# Patient Record
Sex: FEMALE | Race: WHITE | NOT HISPANIC OR LATINO | Employment: FULL TIME | URBAN - METROPOLITAN AREA
[De-identification: names, ages, dates, MRNs, and addresses within clinical notes are randomized per-mention and may not be internally consistent; named-entity substitution may affect disease eponyms.]

---

## 2017-02-08 ENCOUNTER — COMMUNICATION - HEALTHEAST (OUTPATIENT)
Dept: FAMILY MEDICINE | Facility: CLINIC | Age: 47
End: 2017-02-08

## 2017-05-17 ENCOUNTER — OFFICE VISIT - HEALTHEAST (OUTPATIENT)
Dept: FAMILY MEDICINE | Facility: CLINIC | Age: 47
End: 2017-05-17

## 2017-05-17 DIAGNOSIS — R45.89 DEPRESSED MOOD: ICD-10-CM

## 2017-05-17 DIAGNOSIS — I10 ESSENTIAL HYPERTENSION: ICD-10-CM

## 2017-05-17 DIAGNOSIS — R53.83 OTHER MALAISE AND FATIGUE: ICD-10-CM

## 2017-05-17 DIAGNOSIS — F90.2 ADHD (ATTENTION DEFICIT HYPERACTIVITY DISORDER), COMBINED TYPE: ICD-10-CM

## 2017-05-17 DIAGNOSIS — N18.30 CHRONIC KIDNEY DISEASE, STAGE 3 (H): ICD-10-CM

## 2017-05-17 DIAGNOSIS — R53.81 OTHER MALAISE AND FATIGUE: ICD-10-CM

## 2017-05-17 DIAGNOSIS — R68.89 COLD INTOLERANCE: ICD-10-CM

## 2017-05-17 DIAGNOSIS — M31.30 WEGENER'S GRANULOMATOSIS: ICD-10-CM

## 2017-05-17 ASSESSMENT — MIFFLIN-ST. JEOR: SCORE: 1241.82

## 2017-05-22 LAB
GLIADIN IGA SER-ACNC: 1 U/ML
GLIADIN IGG SER-ACNC: 0.4 U/ML
IGA SERPL-MCNC: 111 MG/DL (ref 65–400)
TTG IGA SER-ACNC: 0.2 U/ML
TTG IGG SER-ACNC: <0.6 U/ML

## 2017-06-30 ENCOUNTER — COMMUNICATION - HEALTHEAST (OUTPATIENT)
Dept: FAMILY MEDICINE | Facility: CLINIC | Age: 47
End: 2017-06-30

## 2017-07-26 ENCOUNTER — COMMUNICATION - HEALTHEAST (OUTPATIENT)
Dept: FAMILY MEDICINE | Facility: CLINIC | Age: 47
End: 2017-07-26

## 2017-08-17 ENCOUNTER — COMMUNICATION - HEALTHEAST (OUTPATIENT)
Dept: FAMILY MEDICINE | Facility: CLINIC | Age: 47
End: 2017-08-17

## 2017-08-22 ENCOUNTER — COMMUNICATION - HEALTHEAST (OUTPATIENT)
Dept: FAMILY MEDICINE | Facility: CLINIC | Age: 47
End: 2017-08-22

## 2017-09-06 ENCOUNTER — AMBULATORY - HEALTHEAST (OUTPATIENT)
Dept: FAMILY MEDICINE | Facility: CLINIC | Age: 47
End: 2017-09-06

## 2017-09-06 DIAGNOSIS — I28.8 PULMONARY VASCULITIS (H): ICD-10-CM

## 2017-09-07 ENCOUNTER — AMBULATORY - HEALTHEAST (OUTPATIENT)
Dept: PULMONOLOGY | Facility: OTHER | Age: 47
End: 2017-09-07

## 2017-09-07 DIAGNOSIS — I28.8 PULMONARY VASCULITIS (H): ICD-10-CM

## 2017-09-13 ENCOUNTER — COMMUNICATION - HEALTHEAST (OUTPATIENT)
Dept: PULMONOLOGY | Facility: OTHER | Age: 47
End: 2017-09-13

## 2017-09-19 ENCOUNTER — COMMUNICATION - HEALTHEAST (OUTPATIENT)
Dept: SCHEDULING | Facility: CLINIC | Age: 47
End: 2017-09-19

## 2017-11-19 ENCOUNTER — COMMUNICATION - HEALTHEAST (OUTPATIENT)
Dept: FAMILY MEDICINE | Facility: CLINIC | Age: 47
End: 2017-11-19

## 2017-12-14 ENCOUNTER — RECORDS - HEALTHEAST (OUTPATIENT)
Dept: ADMINISTRATIVE | Facility: OTHER | Age: 47
End: 2017-12-14

## 2017-12-14 LAB
HPV_EXT - HISTORICAL: NORMAL
PAP SMEAR - HIM PATIENT REPORTED: NORMAL

## 2017-12-21 ENCOUNTER — HOSPITAL ENCOUNTER (OUTPATIENT)
Dept: MAMMOGRAPHY | Facility: HOSPITAL | Age: 47
Discharge: HOME | End: 2017-12-21
Attending: OBSTETRICS & GYNECOLOGY

## 2017-12-21 ENCOUNTER — COMMUNICATION - HEALTHEAST (OUTPATIENT)
Dept: FAMILY MEDICINE | Facility: CLINIC | Age: 47
End: 2017-12-21

## 2017-12-21 DIAGNOSIS — Z12.31 VISIT FOR SCREENING MAMMOGRAM: ICD-10-CM

## 2018-02-16 ENCOUNTER — RECORDS - HEALTHEAST (OUTPATIENT)
Dept: ADMINISTRATIVE | Facility: OTHER | Age: 48
End: 2018-02-16

## 2018-03-29 ENCOUNTER — OFFICE VISIT - HEALTHEAST (OUTPATIENT)
Dept: FAMILY MEDICINE | Facility: CLINIC | Age: 48
End: 2018-03-29

## 2018-03-29 DIAGNOSIS — F90.2 ADHD (ATTENTION DEFICIT HYPERACTIVITY DISORDER), COMBINED TYPE: ICD-10-CM

## 2018-03-29 DIAGNOSIS — K21.9 GERD (GASTROESOPHAGEAL REFLUX DISEASE): ICD-10-CM

## 2018-03-29 DIAGNOSIS — E55.9 VITAMIN D DEFICIENCY: ICD-10-CM

## 2018-03-29 DIAGNOSIS — M31.30 GRANULOMATOSIS WITH POLYANGIITIS (H): ICD-10-CM

## 2018-03-29 DIAGNOSIS — J32.4 CHRONIC PANSINUSITIS: ICD-10-CM

## 2018-03-29 DIAGNOSIS — N18.30 CHRONIC KIDNEY DISEASE, STAGE 3 (H): ICD-10-CM

## 2018-03-29 ASSESSMENT — MIFFLIN-ST. JEOR: SCORE: 1271.92

## 2018-05-06 ENCOUNTER — COMMUNICATION - HEALTHEAST (OUTPATIENT)
Dept: FAMILY MEDICINE | Facility: CLINIC | Age: 48
End: 2018-05-06

## 2018-06-19 ENCOUNTER — COMMUNICATION - HEALTHEAST (OUTPATIENT)
Dept: FAMILY MEDICINE | Facility: CLINIC | Age: 48
End: 2018-06-19

## 2018-08-16 ENCOUNTER — COMMUNICATION - HEALTHEAST (OUTPATIENT)
Dept: FAMILY MEDICINE | Facility: CLINIC | Age: 48
End: 2018-08-16

## 2018-10-09 ENCOUNTER — COMMUNICATION - HEALTHEAST (OUTPATIENT)
Dept: FAMILY MEDICINE | Facility: CLINIC | Age: 48
End: 2018-10-09

## 2018-11-26 ENCOUNTER — COMMUNICATION - HEALTHEAST (OUTPATIENT)
Dept: FAMILY MEDICINE | Facility: CLINIC | Age: 48
End: 2018-11-26

## 2019-03-12 ENCOUNTER — COMMUNICATION - HEALTHEAST (OUTPATIENT)
Dept: FAMILY MEDICINE | Facility: CLINIC | Age: 49
End: 2019-03-12

## 2019-03-18 ENCOUNTER — COMMUNICATION - HEALTHEAST (OUTPATIENT)
Dept: FAMILY MEDICINE | Facility: CLINIC | Age: 49
End: 2019-03-18

## 2019-03-21 ENCOUNTER — HOSPITAL ENCOUNTER (OUTPATIENT)
Dept: MAMMOGRAPHY | Facility: CLINIC | Age: 49
Discharge: HOME | End: 2019-03-21
Attending: OBSTETRICS & GYNECOLOGY

## 2019-03-21 DIAGNOSIS — Z12.31 VISIT FOR SCREENING MAMMOGRAM: ICD-10-CM

## 2019-03-22 ENCOUNTER — RECORDS - HEALTHEAST (OUTPATIENT)
Dept: ADMINISTRATIVE | Facility: OTHER | Age: 49
End: 2019-03-22

## 2019-03-26 ENCOUNTER — HOSPITAL ENCOUNTER (OUTPATIENT)
Dept: MAMMOGRAPHY | Facility: CLINIC | Age: 49
Discharge: HOME | End: 2019-03-26
Attending: OBSTETRICS & GYNECOLOGY

## 2019-03-26 DIAGNOSIS — N64.89 BREAST ASYMMETRY: ICD-10-CM

## 2019-07-08 ENCOUNTER — COMMUNICATION - HEALTHEAST (OUTPATIENT)
Dept: FAMILY MEDICINE | Facility: CLINIC | Age: 49
End: 2019-07-08

## 2019-07-08 DIAGNOSIS — F90.2 ADHD (ATTENTION DEFICIT HYPERACTIVITY DISORDER), COMBINED TYPE: ICD-10-CM

## 2019-09-01 ENCOUNTER — COMMUNICATION - HEALTHEAST (OUTPATIENT)
Dept: FAMILY MEDICINE | Facility: CLINIC | Age: 49
End: 2019-09-01

## 2019-09-09 ENCOUNTER — COMMUNICATION - HEALTHEAST (OUTPATIENT)
Dept: FAMILY MEDICINE | Facility: CLINIC | Age: 49
End: 2019-09-09

## 2019-09-09 DIAGNOSIS — F90.2 ADHD (ATTENTION DEFICIT HYPERACTIVITY DISORDER), COMBINED TYPE: ICD-10-CM

## 2019-11-14 ENCOUNTER — OFFICE VISIT - HEALTHEAST (OUTPATIENT)
Dept: FAMILY MEDICINE | Facility: CLINIC | Age: 49
End: 2019-11-14

## 2019-11-14 DIAGNOSIS — N18.30 CHRONIC KIDNEY DISEASE, STAGE 3 (H): ICD-10-CM

## 2019-11-14 DIAGNOSIS — R10.13 ABDOMINAL PAIN, EPIGASTRIC: ICD-10-CM

## 2019-11-14 DIAGNOSIS — M31.31 GRANULOMATOSIS WITH POLYANGIITIS WITH RENAL INVOLVEMENT (H): ICD-10-CM

## 2019-11-14 DIAGNOSIS — Z13.220 SCREENING CHOLESTEROL LEVEL: ICD-10-CM

## 2019-11-14 DIAGNOSIS — F90.2 ADHD (ATTENTION DEFICIT HYPERACTIVITY DISORDER), COMBINED TYPE: ICD-10-CM

## 2019-11-14 LAB
CHOLEST SERPL-MCNC: 261 MG/DL
FASTING STATUS PATIENT QL REPORTED: YES
HDLC SERPL-MCNC: 84 MG/DL
LDLC SERPL CALC-MCNC: 159 MG/DL
TRIGL SERPL-MCNC: 88 MG/DL

## 2019-11-14 ASSESSMENT — MIFFLIN-ST. JEOR: SCORE: 1192.49

## 2019-11-22 ENCOUNTER — RECORDS - HEALTHEAST (OUTPATIENT)
Dept: ADMINISTRATIVE | Facility: OTHER | Age: 49
End: 2019-11-22

## 2019-11-22 ENCOUNTER — TRANSFERRED RECORDS (OUTPATIENT)
Dept: HEALTH INFORMATION MANAGEMENT | Facility: CLINIC | Age: 49
End: 2019-11-22

## 2019-12-05 ENCOUNTER — TRANSFERRED RECORDS (OUTPATIENT)
Dept: HEALTH INFORMATION MANAGEMENT | Facility: CLINIC | Age: 49
End: 2019-12-05

## 2019-12-19 ENCOUNTER — TELEPHONE (OUTPATIENT)
Dept: OTOLARYNGOLOGY | Facility: CLINIC | Age: 49
End: 2019-12-19

## 2019-12-19 NOTE — TELEPHONE ENCOUNTER
"[facility]    Phone Message    May a detailed message be left on voicemail: no    Reason for Call: Angie from [facility] calling to check up on referral they sent \"electronically.\" Writer informed caller that we have not received referral yet and asked caller to fax referral and records to: [phone]. Caller stating that they are referring patient for Cholesteatoma. Per scheduling guidelines patient is to be seen within 10 days. Writer informed caller that someone from the clinic would be contacting patient to schedule.   Please advise. Thank you, Judy Garcia on 12/19/2019 at 4:45 PM     Action Taken: Message routed to:  [facility] E[facility]    "

## 2019-12-20 ENCOUNTER — TRANSCRIBE ORDERS (OUTPATIENT)
Dept: OTHER | Age: 49
End: 2019-12-20

## 2019-12-20 DIAGNOSIS — H71.90 CHOLESTEATOMA, UNSPECIFIED LATERALITY: Primary | ICD-10-CM

## 2019-12-20 NOTE — TELEPHONE ENCOUNTER
Records and referral received and sent to HIM to be scanned into the chart.  Copy placed in clinic referrals for review rightfax folder.

## 2019-12-27 ENCOUNTER — TELEPHONE (OUTPATIENT)
Dept: OTOLARYNGOLOGY | Facility: CLINIC | Age: 49
End: 2019-12-27

## 2019-12-30 NOTE — TELEPHONE ENCOUNTER
FUTURE VISIT INFORMATION      FUTURE VISIT INFORMATION:    Date: 2/11/20    Time: 8:40 AM; Audio 7:30 AM    Location[place] [facility]  REFERRAL INFORMATION:    Referring provider:  Dr. Mikey Franco    Referring providers clinic:  [facility]    Reason for visit/diagnosis: Cholesteatoma    RECORDS REQUESTED FROM:       Clinic name Comments Records Status Imaging Status   [facility] 12/22/19 - OV with Dr. Franco  11/22/19 - OV with Dr. Franco  11/22/19 - Audiogram 12/31 Sent to Scan    [facility] 10/20/09 - OP Note for RIGHT TYMPAnOPLASTY WITH CANAL UP MASTOIDECTOMY with Dr. Nas Oswald 12/31 Sent to Scan    [facility] 12/5/19 - CT Temporal Bone WO Scanned In 12/30 Req - PACS                             * 12/30/19 9:12 AM Faxed req to [facility] for Audiograms and additional records, and [facility] for images - Marcela  * 12/30/19 5:33PM images from [facility] in PACS, waiting on [facility] recs - Amay   * 12/31/19 12:27 PM Recs received from [facility] and sent to HIM to be scanned into the chart. - Marcela

## 2020-01-20 ENCOUNTER — OFFICE VISIT - HEALTHEAST (OUTPATIENT)
Dept: FAMILY MEDICINE | Facility: CLINIC | Age: 50
End: 2020-01-20

## 2020-01-20 DIAGNOSIS — F33.0 MILD EPISODE OF RECURRENT MAJOR DEPRESSIVE DISORDER (H): ICD-10-CM

## 2020-01-20 DIAGNOSIS — F90.2 ADHD (ATTENTION DEFICIT HYPERACTIVITY DISORDER), COMBINED TYPE: ICD-10-CM

## 2020-01-20 DIAGNOSIS — F41.9 ANXIETY: ICD-10-CM

## 2020-01-20 ASSESSMENT — ANXIETY QUESTIONNAIRES
3. WORRYING TOO MUCH ABOUT DIFFERENT THINGS: SEVERAL DAYS
6. BECOMING EASILY ANNOYED OR IRRITABLE: SEVERAL DAYS
4. TROUBLE RELAXING: MORE THAN HALF THE DAYS
1. FEELING NERVOUS, ANXIOUS, OR ON EDGE: SEVERAL DAYS
7. FEELING AFRAID AS IF SOMETHING AWFUL MIGHT HAPPEN: NOT AT ALL
5. BEING SO RESTLESS THAT IT IS HARD TO SIT STILL: SEVERAL DAYS
2. NOT BEING ABLE TO STOP OR CONTROL WORRYING: SEVERAL DAYS
GAD7 TOTAL SCORE: 7

## 2020-01-20 ASSESSMENT — MIFFLIN-ST. JEOR: SCORE: 1215.79

## 2020-01-20 ASSESSMENT — PATIENT HEALTH QUESTIONNAIRE - PHQ9: SUM OF ALL RESPONSES TO PHQ QUESTIONS 1-9: 6

## 2020-01-24 DIAGNOSIS — H91.90 HEARING LOSS, UNSPECIFIED HEARING LOSS TYPE, UNSPECIFIED LATERALITY: Primary | ICD-10-CM

## 2020-02-11 ENCOUNTER — PRE VISIT (OUTPATIENT)
Dept: OTOLARYNGOLOGY | Facility: CLINIC | Age: 50
End: 2020-02-11

## 2020-02-11 ENCOUNTER — OFFICE VISIT (OUTPATIENT)
Dept: AUDIOLOGY | Facility: CLINIC | Age: 50
End: 2020-02-11
Payer: COMMERCIAL

## 2020-02-11 ENCOUNTER — TELEPHONE (OUTPATIENT)
Dept: OTOLARYNGOLOGY | Facility: CLINIC | Age: 50
End: 2020-02-11

## 2020-02-11 ENCOUNTER — OFFICE VISIT (OUTPATIENT)
Dept: OTOLARYNGOLOGY | Facility: CLINIC | Age: 50
End: 2020-02-11
Attending: OTOLARYNGOLOGY
Payer: COMMERCIAL

## 2020-02-11 VITALS
TEMPERATURE: 100.3 F | WEIGHT: 141.9 LBS | SYSTOLIC BLOOD PRESSURE: 122 MMHG | DIASTOLIC BLOOD PRESSURE: 77 MMHG | HEART RATE: 109 BPM

## 2020-02-11 DIAGNOSIS — H90.71 MIXED CONDUCTIVE AND SENSORINEURAL HEARING LOSS OF RIGHT EAR WITH UNRESTRICTED HEARING OF LEFT EAR: ICD-10-CM

## 2020-02-11 DIAGNOSIS — H90.71 MIXED HEARING LOSS OF RIGHT EAR: Primary | ICD-10-CM

## 2020-02-11 DIAGNOSIS — H91.90 HEARING LOSS, UNSPECIFIED HEARING LOSS TYPE, UNSPECIFIED LATERALITY: ICD-10-CM

## 2020-02-11 DIAGNOSIS — H73.891 TYMPANIC MEMBRANE RETRACTION, RIGHT: Primary | ICD-10-CM

## 2020-02-11 PROBLEM — J38.6 SUBGLOTTIC STENOSIS: Status: ACTIVE | Noted: 2020-02-11

## 2020-02-11 RX ORDER — FLUTICASONE PROPIONATE 50 MCG
SPRAY, SUSPENSION (ML) NASAL
COMMUNITY
Start: 2019-11-26 | End: 2021-05-13

## 2020-02-11 RX ORDER — IBUPROFEN 600 MG/1
600 TABLET, FILM COATED ORAL
COMMUNITY
Start: 2014-02-23 | End: 2022-11-21

## 2020-02-11 RX ORDER — FLUTICASONE PROPIONATE 220 UG/1
2 AEROSOL, METERED RESPIRATORY (INHALATION)
COMMUNITY
Start: 2017-05-17 | End: 2021-05-13

## 2020-02-11 RX ORDER — LOSARTAN POTASSIUM 25 MG/1
25 TABLET ORAL
COMMUNITY
Start: 2018-03-29 | End: 2021-07-28

## 2020-02-11 RX ORDER — SULFAMETHOXAZOLE/TRIMETHOPRIM 800-160 MG
TABLET ORAL
COMMUNITY
Start: 2019-09-17

## 2020-02-11 ASSESSMENT — PAIN SCALES - GENERAL: PAINLEVEL: NO PAIN (0)

## 2020-02-11 NOTE — TELEPHONE ENCOUNTER
Patient left without checking out.    Attempted to call patient to schedule [facility] NEW THROAT appointment with Dr. Murray, Dr. Hagan, or Dr. Adan. Appointment notes: Airway clearance for surgery, subglottic stenosis, ref'd by Dr. Mckeon.    Phone was unavailable/didn't ring/only loud beeping. Unable to leave .

## 2020-02-11 NOTE — LETTER
"2020       RE: Dasha Valenzuela   [address]  [place] [address]     Dear Colleague,    Thank you for referring your patient, Dasha Valenzuela, to the [facility] EAR NOSE AND THROAT at [facility]. Please see a copy of my visit note below.      Neurotology Clinic      Name: Dasha Valenzuela  MRN: [id number]  Age: 49 year old  : 1970  Referring provider: Vamsi Franco  2020      Chief Complaint:   Consult     History of Present Illness:   Dasha Valenzuela is a 49 year old female who presents for consultation regarding hearing loss.  Consultation was requested by Dr. Vamsi Franco.  She notes her right ear has been problematic for many years and she had a previous canal wall up mastoidectomy approximately 10 years ago.  She did notice improvement in her hearing after that surgery.  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  This lead her to follow up with her ENT and have a repeat audiogram and CT scan.  She has tried in-canal hearing aid in the past however this is difficult as she uses a stethoscope frequently in her job.  She also did not find the hearing aid particularly helpful.      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  She did have significant laryngitis at the time.  She had what may have been laryngospasm following the procedure (not clear if actually subglottic stenosis-related but concerning for it) and required an ICU stay.  She has not had any ongoing symptoms since that time however she has also not been intubated since then.    Excerpt from previous operative note - right tympanoplasty with canal wall up mastoidectomy 10/20/2009:  \"She failed tympanoplasty about 10 years " "ago....Severely retracted mid-portion of the TM.... The middle ear was filled, especially posteriorly and inferiorly with what looked to be tympanosclerotic plaque.  The retraction pocket was slowly and methodically freed up.  It went quit a bit anteriorly and this was all removed.  There was thickened, heaped up mucosa around the ossicles.  This was cleared out and this revealed although there was some erosion of the lenticular process of the incus, the dadbfom-xukgl-blbjcp complex was intact and mobile.\"     Review of Systems:   Pertinent items are noted in HPI or as in patient entered ROS below, remainder of complete ROS is negative.    ENT ROS 2020   Cardiopulmonary Cough        Active Medications:     Current Outpatient Medications:      fluticasone (FLOVENT HFA) 220 MCG/ACT inhaler, Inhale 2 puffs into the lungs, Disp: , Rfl:      ibuprofen (ADVIL/MOTRIN) 600 MG tablet, Take 600 mg by mouth, Disp: , Rfl:      losartan (COZAAR) 25 MG tablet, Take 25 mg by mouth, Disp: , Rfl:      sulfamethoxazole-trimethoprim (BACTRIM DS/SEPTRA DS) 800-160 MG tablet, TAKE 1 TABLET BY MOUTH DAY, Disp: , Rfl:      AZATHIOPRINE PO, Take 150 mg by mouth daily, Disp: , Rfl:      fluticasone (FLONASE) 50 MCG/ACT nasal spray, , Disp: , Rfl:      Pantoprazole Sodium (PROTONIX PO), Take 40 mg by mouth every morning (before breakfast), Disp: , Rfl:      Prenatal Vit-Fe Fumarate-FA (PRENATAL MULTIVITAMIN  PLUS IRON) 27-0.8 MG TABS, Take 1 tablet by mouth daily, Disp: , Rfl:      sertraline (ZOLOFT) 50 MG tablet, , Disp: , Rfl:      Allergies:   No known drug allergies.       Past Medical History:  Wegener's granulomatosis  Nephrotic syndrome  Chronic kidney disease, stage 3  Gastroesophageal reflux disease  ASCUS Pap smear  Chronic sinusitis  Anxiety  Attention deficit hyperactivity disorder      Past Surgical History:   section   Nasal surgery    Family History:   History reviewed. No pertinent family history.      Social " History:   Presents to clinic alone.  Tobacco Use: No previous or current tobacco use.   Alcohol Use: Occasional alcohol use.      Physical Exam:   /77   Pulse 109   Temp 100.3  F (37.9  C)   Wt 64.4 kg (141 lb 14.4 oz)      Constitutional:  The patient was unaccompanied, well-groomed, and in no acute distress.     Skin: Normal:  warm and pink without rash   Neurologic: Alert and oriented x 3.  CN's III-XII within normal limits.  Voice normal.    Psychiatric: The patient's affect was calm, cooperative, and appropriate.     Communication:  Normal; communicates verbally, normal voice quality.   Respiratory: Breathing comfortably without stridor or exertion of accessory muscles.    Eyes: Pupils were equal and reactive.  Extraocular movement intact.     Ears: Pinnae and tragus non-tender.         Otologic microscope exam:  Right ear: Anterior superior perforation present.  Malleus in place.  Remainder of reconstructed TM retracted onto incus, possibly already onto the stapes.  No debris. TM thick inferiorly and suspect scar to promontory. No cholesteatoma. Posterior auricular incision.  Left ear: EAC and TM are clear.     Audiogram:  AUDIOGRAM: She underwent an audiogram today. This demonstrated:  Results: Normal hearing left ear. Mild to moderately-severe mixed hearing loss right ear. Negative Daria. Normal eardrum mobility left, restricted right. Present left ipsi reflex, all other conditions absent.    The speech reception threshold is 60 dB on the right, 15 dB on the left, with 100% word recognition. Tympanograms showed As type on the right and type A on the left.     Imaging:  Temporal bone CT 12/5/2019:       On my review of the patient's imaging:  Left temporal bone appears normal.  Right side appears to have previous partial mastoidectomy and there is opacification in the middle ear space.      Assessment and Plan:  Dasha Valenzuela is a 49 year old female with moderately-severe mixed hearing loss in  the right ear.  We discussed options at length including ongoing surveillance, hearing aid, and surgical management.  With her history of vasculitis, she does not have normal naso-sinus anatomy or eustachian tube function.  Hearing aid would definitely help her hearing however she it not interested given her previous experience with an in canal aid. I encouraged her to try another model of hearing aid.    We discussed right revision tympanomastoidectomy with cartilage and laser and T-tube placement.  There is a distinct possibility this would not improve her hearing. I am sure it will not make it symmetric with the other side. There is a risk of making it worse.  One reason for doing it would be to see if the retraction can be addressed, but given the GPA, a fully aerated middle ear space may not be achievable. Tube or leaving the current perforation in place are likely needed.    The risks and benefits were discussed.  The risks include but are not limited to:  Worsened hearing which may require further surgery, profound and irreversible hearing loss, dizziness, damage to the taste nerve, damage to the facial nerve, tympanic membrane perforation requiring further surgery and infection.  Postoperative restrictions were discussed.      She will need airway clearance given her history of subglottic stenosis and I have asked her to have her [facility] records transferred here as well as see our anesthesia team prior to surgery.  I also offered referral to laryngology for assessment and that may be needed.    Yue Mckeon MD  Otology & Neurotology  [facility]    The documentation recorded by the scribe accurately reflects the services I personally performed and the decisions made by me.        Scribe Disclosure:  I, Yohana Cristin, am serving as a scribe to document services personally performed by Yue Mckeon MD at this visit, based upon the provider's statements to me. All documentation has been  reviewed by the aforementioned provider prior to being entered into the official medical record.    Again, thank you for allowing me to participate in the care of your patient.      Sincerely,    Yue Mckeon MD

## 2020-02-11 NOTE — NURSING NOTE
Chief Complaint   Patient presents with     Consult     Cholesteatoma right side      Cough     fever      /77   Pulse 109   Temp 100.3  F (37.9  C)   Wt 64.4 kg (141 lb 14.4 oz)     Claudette Thomas LPN

## 2020-02-11 NOTE — NURSING NOTE
Plan of care:  Patient will require airway assessment and clearance at [facility] with one of the larynogologists: Loco Murray Misono for known subglottic stenosis prior to scheduling right revision tympanomastoidectomy/cartilage backed with T-tube placement, diode laser/ Facial nerve monitor.   Patient reports that she has had laryngospasm and re-intubation after previous intubation.( [place]).  Dr. Mckeon also reviewed that use of hearing aids is another reasonable modality recommended  To address her hearing concern.  Patient did not have instruction in this regard prior to leaving the clinic as she had left room, was not feeling well.    AVS with this information has been completed and mailed to patient.Schedulers will call to make this appt.    RELL López R.N., ABDIEL

## 2020-02-11 NOTE — PATIENT INSTRUCTIONS
1.  You were seen in the ENT Clinic today by . If you have any questions or concerns after your appointment, please call [phone]. Press option #1 for scheduling related needs. Press option #3 for Nurse advice.    2.  Please schedule an appointment for the following:   - laryngologists for airway clearance before moving forward with surgery scheduling.          Yas Ruiz LPN  [facility] - Otolaryngology    Thanks for coming in today: prior to scehduling ear surgery, Dr Mckeon  requires airway assessment and [place] [facility] [place] one of the larynogologists: Loco Murray Misono for known subglottic stenosis prior to scheduling right revision tympanomastoidectomy with T-tube placement, diode laser. Our schedulers will reach out to schedule this laryngology appointment.       Dr. Mckeon also reviewed that use of hearing aids is another reasonable modality recommended to address your hearing concerns.    Thank you,  RELL López R.N., CORLN

## 2020-02-11 NOTE — NURSING NOTE
If patient would like to move forward with ear surgery with Dr. Mckeon the patient will need to make an appointment with Laryngology, either Dr. Hagan, Dr. Murray, or Dr. Adan for airway clearance and surgical risk related to Patient's significant history of subglottic stenosis. Once patient has received airway clearance and surgical risk is known a case request for surgery may be put in after reviewed by Dr. Mckeon. Patient is thinking she will move forward with surgery sometime this summer. Patient will need to meet with PAC for her pre-op history and physical. Case request for surgery with Dr. Mckeon are as follows:    Procedure: Right Revision Tympanomastoidectomy with T-Tube placement at [facility]    Additional Instructions for Case Request       Is this a multi surgeon/procedure case?: No  Surgeon Procedure time (incision-close in minutes) 210  Explant?: No  Urgency of Surgery?: Patients Choice/ Next Available  H&P to be completed by?:PAC  Patients special needs?:  N/A  Patient Positioning?: Supine   needed?:  No  Reservable OR equipment needed?:  Diode Laser and Facial Nerve Monitoring  Expected Length of Stay:Today  Postop appointment?:3 weeks  Surgical assistants?:  No  Patient has electric implant?:  No  Implants required for the case?:  No  Other\additional comments as needed?: N/A  Pre-Op Medications: cefuroxime sodium 1.5 gm  Imaging Needed Pre-op? What type? N/A  Packet Sent/ Given to Patient? No    Patient left clinic prior to discussion about this. Patient will be mailed a plan as clinic coordinator attempted to call patient and was unable to connect. Patient will need surgical packet and soap once surgery has been scheduled.     Lizz Van RN

## 2020-02-11 NOTE — PROGRESS NOTES
AUDIOLOGY REPORT    SUMMARY: Audiology visit completed. See audiogram for results.      RECOMMENDATIONS: Follow-up with ENT.    Chepe Foreman, [facility]  Licensed Audiologist  [place] License #[id number]

## 2020-02-11 NOTE — PROGRESS NOTES
"  Neurotology Clinic      Name: Dasha Valenzuela  MRN: [id number]  Age: 49 year old  : 1970  Referring provider: Vamsi Franco  2020      Chief Complaint:   Consult     History of Present Illness:   Dasha Valenzuela is a 49 year old female who presents for consultation regarding hearing loss.  Consultation was requested by Dr. Vamsi Franco.  She notes her right ear has been problematic for many years and she had a previous canal wall up mastoidectomy approximately 10 years ago.  She did notice improvement in her hearing after that surgery.  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  This lead her to follow up with her ENT and have a repeat audiogram and CT scan.  She has tried in-canal hearing aid in the past however this is difficult as she uses a stethoscope frequently in her job.  She also did not find the hearing aid particularly helpful.      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  She did have significant laryngitis at the time.  She had what may have been laryngospasm following the procedure (not clear if actually subglottic stenosis-related but concerning for it) and required an ICU stay.  She has not had any ongoing symptoms since that time however she has also not been intubated since then.    Excerpt from previous operative note - right tympanoplasty with canal wall up mastoidectomy 10/20/2009:  \"She failed tympanoplasty about 10 years ago....Severely retracted mid-portion of the TM.... The middle ear was filled, especially posteriorly and inferiorly with what looked to be tympanosclerotic plaque.  The retraction pocket was slowly and methodically freed up.  It went quit a bit anteriorly and this was all removed.  There was " "thickened, heaped up mucosa around the ossicles.  This was cleared out and this revealed although there was some erosion of the lenticular process of the incus, the sldnsup-hlshe-udbycw complex was intact and mobile.\"     Review of Systems:   Pertinent items are noted in HPI or as in patient entered ROS below, remainder of complete ROS is negative.   [facility] ROS 2020   Cardiopulmonary Cough        Active Medications:     Current Outpatient Medications:      fluticasone (FLOVENT HFA) 220 MCG/ACT inhaler, Inhale 2 puffs into the lungs, Disp: , Rfl:      ibuprofen (ADVIL/MOTRIN) 600 MG tablet, Take 600 mg by mouth, Disp: , Rfl:      losartan (COZAAR) 25 MG tablet, Take 25 mg by mouth, Disp: , Rfl:      sulfamethoxazole-trimethoprim (BACTRIM DS/SEPTRA DS) 800-160 MG tablet, TAKE 1 TABLET BY MOUTH DAY, Disp: , Rfl:      AZATHIOPRINE PO, Take 150 mg by mouth daily, Disp: , Rfl:      fluticasone (FLONASE) 50 MCG/ACT nasal spray, , Disp: , Rfl:      Pantoprazole Sodium (PROTONIX PO), Take 40 mg by mouth every morning (before breakfast), Disp: , Rfl:      Prenatal Vit-Fe Fumarate-FA (PRENATAL MULTIVITAMIN  PLUS IRON) 27-0.8 MG TABS, Take 1 tablet by mouth daily, Disp: , Rfl:      sertraline (ZOLOFT) 50 MG tablet, , Disp: , Rfl:      Allergies:   No known drug allergies.       Past Medical History:  Wegener's granulomatosis  Nephrotic syndrome  Chronic kidney disease, stage 3  Gastroesophageal reflux disease  ASCUS Pap smear  Chronic sinusitis  Anxiety  Attention deficit hyperactivity disorder      Past Surgical History:   section   Nasal surgery    Family History:   History reviewed. No pertinent family history.      Social History:   Presents to clinic alone.  Tobacco Use: No previous or current tobacco use.   Alcohol Use: Occasional alcohol use.      Physical Exam:   /77   Pulse 109   Temp 100.3  F (37.9  C)   Wt 64.4 kg (141 lb 14.4 oz)      Constitutional:  The patient was unaccompanied, " well-groomed, and in no acute distress.     Skin: Normal:  warm and pink without rash   Neurologic: Alert and oriented x 3.  CN's III-XII within normal limits.  Voice normal.    Psychiatric: The patient's affect was calm, cooperative, and appropriate.     Communication:  Normal; communicates verbally, normal voice quality.   Respiratory: Breathing comfortably without stridor or exertion of accessory muscles.    Eyes: Pupils were equal and reactive.  Extraocular movement intact.     Ears: Pinnae and tragus non-tender.         Otologic microscope exam:  Right ear: Anterior superior perforation present.  Malleus in place.  Remainder of reconstructed TM retracted onto incus, possibly already onto the stapes.  No debris. TM thick inferiorly and suspect scar to promontory. No cholesteatoma. Posterior auricular incision.  Left ear: EAC and TM are clear.     Audiogram:  AUDIOGRAM: She underwent an audiogram today. This demonstrated:  Results: Normal hearing left ear. Mild to moderately-severe mixed hearing loss right ear. Negative Daria. Normal eardrum mobility left, restricted right. Present left ipsi reflex, all other conditions absent.    The speech reception threshold is 60 dB on the right, 15 dB on the left, with 100% word recognition. Tympanograms showed As type on the right and type A on the left.     Imaging:  Temporal bone CT 12/5/2019:       On my review of the patient's imaging:  Left temporal bone appears normal.  Right side appears to have previous partial mastoidectomy and there is opacification in the middle ear space.      Assessment and Plan:  Dasha Valenzuela is a 49 year old female with moderately-severe mixed hearing loss in the right ear.  We discussed options at length including ongoing surveillance, hearing aid, and surgical management.  With her history of vasculitis, she does not have normal naso-sinus anatomy or eustachian tube function.  Hearing aid would definitely help her hearing however she it  not interested given her previous experience with an in canal aid. I encouraged her to try another model of hearing aid.    We discussed right revision tympanomastoidectomy with cartilage and laser and T-tube placement.  There is a distinct possibility this would not improve her hearing. I am sure it will not make it symmetric with the other side. There is a risk of making it worse.  One reason for doing it would be to see if the retraction can be addressed, but given the GPA, a fully aerated middle ear space may not be achievable. Tube or leaving the current perforation in place are likely needed.    The risks and benefits were discussed.  The risks include but are not limited to:  Worsened hearing which may require further surgery, profound and irreversible hearing loss, dizziness, damage to the taste nerve, damage to the facial nerve, tympanic membrane perforation requiring further surgery and infection.  Postoperative restrictions were discussed.      She will need airway clearance given her history of subglottic stenosis and I have asked her to have her [facility] records transferred here as well as see our anesthesia team prior to surgery.  I also offered referral to laryngology for assessment and that may be needed.    Yue Mckeon MD  Otology & Neurotology  [facility]    The documentation recorded by the scribe accurately reflects the services I personally performed and the decisions made by me.        Scribe Disclosure:  I, Yohana Azevedonyla, am serving as a scribe to document services personally performed by Yue Mckeon MD at this visit, based upon the provider's statements to me. All documentation has been reviewed by the aforementioned provider prior to being entered into the official medical record.

## 2020-03-12 ENCOUNTER — RECORDS - HEALTHEAST (OUTPATIENT)
Dept: ADMINISTRATIVE | Facility: OTHER | Age: 50
End: 2020-03-12

## 2020-04-30 ENCOUNTER — COMMUNICATION - HEALTHEAST (OUTPATIENT)
Dept: FAMILY MEDICINE | Facility: CLINIC | Age: 50
End: 2020-04-30

## 2020-04-30 ENCOUNTER — AMBULATORY - HEALTHEAST (OUTPATIENT)
Dept: FAMILY MEDICINE | Facility: CLINIC | Age: 50
End: 2020-04-30

## 2020-04-30 DIAGNOSIS — F41.9 ANXIETY: ICD-10-CM

## 2020-07-29 ENCOUNTER — RECORDS - HEALTHEAST (OUTPATIENT)
Dept: HEALTH INFORMATION MANAGEMENT | Facility: CLINIC | Age: 50
End: 2020-07-29

## 2020-10-01 ENCOUNTER — RECORDS - HEALTHEAST (OUTPATIENT)
Dept: ADMINISTRATIVE | Facility: OTHER | Age: 50
End: 2020-10-01

## 2020-10-01 ENCOUNTER — HOSPITAL ENCOUNTER (OUTPATIENT)
Dept: MAMMOGRAPHY | Facility: CLINIC | Age: 50
Discharge: HOME | End: 2020-10-01
Attending: OBSTETRICS & GYNECOLOGY

## 2020-10-01 DIAGNOSIS — Z12.31 SCREENING MAMMOGRAM, ENCOUNTER FOR: ICD-10-CM

## 2020-10-15 ENCOUNTER — HOSPITAL ENCOUNTER (OUTPATIENT)
Dept: MAMMOGRAPHY | Facility: CLINIC | Age: 50
Discharge: HOME | End: 2020-10-15
Attending: OBSTETRICS & GYNECOLOGY

## 2020-10-15 DIAGNOSIS — N64.89 BREAST ASYMMETRY: ICD-10-CM

## 2020-11-19 ENCOUNTER — OFFICE VISIT - HEALTHEAST (OUTPATIENT)
Dept: FAMILY MEDICINE | Facility: CLINIC | Age: 50
End: 2020-11-19

## 2020-11-19 DIAGNOSIS — M31.31 GRANULOMATOSIS WITH POLYANGIITIS WITH RENAL INVOLVEMENT (H): ICD-10-CM

## 2020-11-19 DIAGNOSIS — J38.6 SUBGLOTTIC STENOSIS: ICD-10-CM

## 2020-11-19 DIAGNOSIS — R05.9 COUGH: ICD-10-CM

## 2020-11-19 DIAGNOSIS — N18.31 STAGE 3A CHRONIC KIDNEY DISEASE (H): ICD-10-CM

## 2020-11-21 ENCOUNTER — COMMUNICATION - HEALTHEAST (OUTPATIENT)
Dept: SCHEDULING | Facility: CLINIC | Age: 50
End: 2020-11-21

## 2020-12-11 ENCOUNTER — COMMUNICATION - HEALTHEAST (OUTPATIENT)
Dept: FAMILY MEDICINE | Facility: CLINIC | Age: 50
End: 2020-12-11

## 2020-12-11 DIAGNOSIS — M31.31 GRANULOMATOSIS WITH POLYANGIITIS WITH RENAL INVOLVEMENT (H): ICD-10-CM

## 2021-04-26 ENCOUNTER — RECORDS - HEALTHEAST (OUTPATIENT)
Dept: ADMINISTRATIVE | Facility: OTHER | Age: 51
End: 2021-04-26

## 2021-04-30 ENCOUNTER — RECORDS - HEALTHEAST (OUTPATIENT)
Dept: FAMILY MEDICINE | Facility: CLINIC | Age: 51
End: 2021-04-30

## 2021-05-06 ENCOUNTER — HOSPITAL ENCOUNTER (OUTPATIENT)
Dept: MAMMOGRAPHY | Facility: CLINIC | Age: 51
Discharge: HOME | End: 2021-05-06
Attending: OBSTETRICS & GYNECOLOGY
Payer: COMMERCIAL

## 2021-05-06 DIAGNOSIS — R92.8 OTHER ABNORMAL AND INCONCLUSIVE FINDINGS ON DIAGNOSTIC IMAGING OF BREAST: ICD-10-CM

## 2021-05-13 ENCOUNTER — RECORDS - HEALTHEAST (OUTPATIENT)
Dept: SCHEDULING | Facility: CLINIC | Age: 51
End: 2021-05-13

## 2021-05-13 ENCOUNTER — NURSE TRIAGE (OUTPATIENT)
Dept: NURSING | Facility: CLINIC | Age: 51
End: 2021-05-13

## 2021-05-13 ENCOUNTER — OFFICE VISIT (OUTPATIENT)
Dept: FAMILY MEDICINE | Facility: CLINIC | Age: 51
End: 2021-05-13
Payer: COMMERCIAL

## 2021-05-13 VITALS
WEIGHT: 152.5 LBS | TEMPERATURE: 97.5 F | RESPIRATION RATE: 20 BRPM | SYSTOLIC BLOOD PRESSURE: 116 MMHG | BODY MASS INDEX: 28.79 KG/M2 | DIASTOLIC BLOOD PRESSURE: 72 MMHG | OXYGEN SATURATION: 98 % | HEART RATE: 90 BPM | HEIGHT: 61 IN

## 2021-05-13 DIAGNOSIS — M31.31 GRANULOMATOSIS WITH POLYANGIITIS WITH RENAL INVOLVEMENT (H): ICD-10-CM

## 2021-05-13 DIAGNOSIS — F90.2 ADHD (ATTENTION DEFICIT HYPERACTIVITY DISORDER), COMBINED TYPE: ICD-10-CM

## 2021-05-13 DIAGNOSIS — F41.9 ANXIETY: Primary | ICD-10-CM

## 2021-05-13 DIAGNOSIS — F33.2 SEVERE EPISODE OF RECURRENT MAJOR DEPRESSIVE DISORDER, WITHOUT PSYCHOTIC FEATURES (H): ICD-10-CM

## 2021-05-13 PROCEDURE — 99214 OFFICE O/P EST MOD 30 MIN: CPT | Performed by: FAMILY MEDICINE

## 2021-05-13 PROCEDURE — 96127 BRIEF EMOTIONAL/BEHAV ASSMT: CPT | Mod: 59 | Performed by: FAMILY MEDICINE

## 2021-05-13 RX ORDER — ALPRAZOLAM 0.5 MG
0.5 TABLET ORAL 3 TIMES DAILY PRN
Qty: 20 TABLET | Refills: 0 | Status: SHIPPED | OUTPATIENT
Start: 2021-05-13 | End: 2022-11-21

## 2021-05-13 RX ORDER — SERTRALINE HYDROCHLORIDE 100 MG/1
100 TABLET, FILM COATED ORAL DAILY
Qty: 90 TABLET | Refills: 1 | Status: SHIPPED | OUTPATIENT
Start: 2021-05-13 | End: 2021-06-10

## 2021-05-13 ASSESSMENT — ANXIETY QUESTIONNAIRES
GAD7 TOTAL SCORE: 18
2. NOT BEING ABLE TO STOP OR CONTROL WORRYING: MORE THAN HALF THE DAYS
7. FEELING AFRAID AS IF SOMETHING AWFUL MIGHT HAPPEN: MORE THAN HALF THE DAYS
IF YOU CHECKED OFF ANY PROBLEMS ON THIS QUESTIONNAIRE, HOW DIFFICULT HAVE THESE PROBLEMS MADE IT FOR YOU TO DO YOUR WORK, TAKE CARE OF THINGS AT HOME, OR GET ALONG WITH OTHER PEOPLE: VERY DIFFICULT
1. FEELING NERVOUS, ANXIOUS, OR ON EDGE: MORE THAN HALF THE DAYS
3. WORRYING TOO MUCH ABOUT DIFFERENT THINGS: NEARLY EVERY DAY
5. BEING SO RESTLESS THAT IT IS HARD TO SIT STILL: NEARLY EVERY DAY
6. BECOMING EASILY ANNOYED OR IRRITABLE: NEARLY EVERY DAY

## 2021-05-13 ASSESSMENT — PATIENT HEALTH QUESTIONNAIRE - PHQ9
SUM OF ALL RESPONSES TO PHQ QUESTIONS 1-9: 16
5. POOR APPETITE OR OVEREATING: NEARLY EVERY DAY

## 2021-05-13 ASSESSMENT — MIFFLIN-ST. JEOR: SCORE: 1259.12

## 2021-05-13 NOTE — PROGRESS NOTES
Assessment & Plan     Anxiety  - sertraline (ZOLOFT) 100 MG tablet; Take 1 tablet (100 mg) by mouth daily  - ALPRAZolam (XANAX) 0.5 MG tablet; Take 1 tablet (0.5 mg) by mouth 3 times daily as needed for anxiety  - MENTAL HEALTH REFERRAL  - Adult; Outpatient Treatment; Individual/Couples/Family/Group Therapy/Health Psychology; [facility]: [facility] [phone]; We will contact you to schedule the appointment or please call with any questions    Severe episode of recurrent major depressive disorder, without psychotic features (H)  - MENTAL HEALTH REFERRAL  - Adult; Outpatient Treatment; Individual/Couples/Family/Group Therapy/Health Psychology; [facility]: [facility] [phone]; We will contact you to schedule the appointment or please call with any questions    ADHD (attention deficit hyperactivity disorder), combined type    Granulomatosis with polyangiitis with renal involvement (H)      We discussed her worsening anxiety and depression symptoms and decided to increase the sertraline dose to 100 mg daily.  She was also given a referral to counseling.  She is having significant anxiety episodes during the day and at night which make it difficult for her to sleep.  She was given a small prescription for alprazolam that she may use as needed.  I explained that our plan is to have this available while the sertraline starts to become more therapeutic.  She will continue to follow closely with her specialist at the [facility] regarding the granulomatosis issue.  I asked her to schedule follow-up with me in 1 month or sooner if needed.                 Depression Screening Follow Up      PHQ 5/13/2021   PHQ-9 Total Score 16   Q9: Thoughts of better off dead/self-harm past 2 weeks Several days         {    Follow Up    Return in about 4 weeks (around 6/10/2021) for Follow up mental health.    Clive Olguin, [facility]    Stacia Lou is a 50 year old  who presents for the following health issues     HPI   She describes having worsening depression and anxiety symptoms over the past several months.  She used to be on sertraline, but weaned off it this past summer.  She has several stressors.  She has 3 daughters.  The oldest is graduating soon.  The middle daughter has had some mental health issues as well.  She is doing poorly in school and had a suicide attempt earlier this year.  She also reports that she had her significant other abruptly broke up yesterday after a year-long relationship.  She reports that this seemed to be out of the blue and has been extremely difficult for her.  She is feeling extremely anxious.  She is having a difficult time falling asleep and staying asleep.  Couple weeks ago she decided to restart sertraline 50 mg daily.  She thinks he needs to be up to 150 mg daily at one point.    She has a medical history significant for ADHD combined type, granulomatosis with polyangiitis, chronic pansinusitis and chronic kidney disease stage III. She is a nurse at [employer]. She sees her specialists at [place] regarding the granulomatosis with polyangiitis on a regular basis.     Medication Followup of sertraline 50 mg    Taking Medication as prescribed: no - restarted a few weeks ago     Side Effects:  None    Medication Helping Symptoms:  yes, but pt would like to increase dosage        Depression and Anxiety Follow-Up    How are you doing with your depression since your last visit? Worsened     How are you doing with your anxiety since your last visit?  Worsened     Are you having other symptoms that might be associated with depression or anxiety? Yes:  sleep issues    Have you had a significant life event? Relationship Concerns - recently suddenly ended    Do you have any concerns with your use of alcohol or other drugs? No    Social History     Tobacco Use     Smoking status: Never Smoker     Smokeless tobacco: Never Used   Substance Use  "Topics     Alcohol use: Yes     Drug use: Never     PHQ 5/13/2021   PHQ-9 Total Score 16   Q9: Thoughts of better off dead/self-harm past 2 weeks Several days     HALLEY-7 SCORE 5/13/2021   Total Score 18     Last PHQ-9 5/13/2021   1.  Little interest or pleasure in doing things 3   2.  Feeling down, depressed, or hopeless 3   3.  Trouble falling or staying asleep, or sleeping too much 3   4.  Feeling tired or having little energy 1   5.  Poor appetite or overeating 1   6.  Feeling bad about yourself 1   7.  Trouble concentrating 3   8.  Moving slowly or restless 0   Q9: Thoughts of better off dead/self-harm past 2 weeks 1   PHQ-9 Total Score 16   Difficulty at work, home, or with people Very difficult             Review of Systems   Constitutional, HEENT, cardiovascular, pulmonary, gi and gu systems are negative, except as otherwise noted.      Objective    /72 (Patient Position: Sitting, Cuff Size: Adult Large)   Pulse 90   Temp 97.5  F (36.4  C) (Tympanic)   Resp 20   Ht 1.549 m (5' 1\")   Wt 69.2 kg (152 lb 8 oz)   LMP 05/06/2021 (Exact Date)   SpO2 98%   BMI 28.81 kg/m    Body mass index is 28.81 kg/m .  Physical Exam   GENERAL: healthy, alert and no distress  EYES: Eyes grossly normal to inspection, PERRL and conjunctivae and sclerae normal  NECK: no adenopathy, no asymmetry, masses, or scars and thyroid normal to palpation  RESP: lungs clear to auscultation - no rales, rhonchi or wheezes  CV: regular rate and rhythm, normal S1 S2, no S3 or S4, no murmur, click or rub, no peripheral edema and peripheral pulses strong  MS: no gross musculoskeletal defects noted, no edema  SKIN: no suspicious lesions or rashes  NEURO: Normal strength and tone, mentation intact and speech normal  PSYCH: She is tearful and appears depressed and anxious during the exam.                "

## 2021-05-13 NOTE — TELEPHONE ENCOUNTER
Dasha feels that she is having a mental relapse today.  Dasha is requesting a renewal of Sertraline.  Dasha is not sleeping and has anxiety.  Dasha is having anxiety due to relationship ending suddenly.  Dasha is requesting an appointment in clinic as she feels there is not threat or harm to herself.  Primary MD use to be MD Gusman but he has recently moved to [facility].  Dasha is requesting to be seen in clinic today to discuss her medication and plan of action for anxiety.    COVID 19 Nurse Triage Plan/Patient Instructions    Please be aware that novel coronavirus (COVID-19) may be circulating in the community. If you develop symptoms such as fever, cough, or SOB or if you have concerns about the presence of another infection including coronavirus (COVID-19), please contact your health care provider or visit [email or web address].     Disposition/Instructions    In-Person Visit with provider recommended. Reference Visit Selection Guide.    Thank you for taking steps to prevent the spread of this virus.  o Limit your contact with others.  o Wear a simple mask to cover your cough.  o Wash your hands well and often.    Resources    [facility]: About COVID-19: [email or web address]    CDC: What to Do If You're Sick: www.cdc.gov/coronavirus/2019-ncov/about/steps-when-sick.html    CDC: Ending Home Isolation: www.cdc.gov/coronavirus/2019-ncov/hcp/disposition-in-home-patients.html     CDC: Caring for Someone: www.cdc.gov/coronavirus/2019-ncov/if-you-are-sick/care-for-someone.html     [facility]: Interim Guidance for Hospital Discharge to Home: www.health.[facility].[place].us/diseases/coronavirus/hcp/hospdischarge.pdf    [facility] clinical trials (COVID-19 research studies): [email or web address]-trials     Below are the COVID-19 hotlines at the [place] Department of Health ([facility]). Interpreters are available.   o For health questions: Call 529-343-5624 or 1-462.780.2786 (7 a.m.  to 7 p.m.)  o For questions about schools and childcare: Call 225-396-9233 or 1-985.531.9094 (7 a.m. to 7 p.m.)                       Reason for Disposition    Patient sounds very upset or troubled to the triager    Additional Information    Negative: Severe difficulty breathing (e.g., struggling for each breath, speaks in single words)    Negative: Bluish (or gray) lips or face    Negative: Difficult to awaken or acting confused  (e.g., disoriented, slurred speech)    Negative: Hysterical or combative behavior    Negative: Sounds like a life-threatening emergency to the triager    Negative: Difficulty breathing and persists > 10 minutes and not relieved by reassurance provided by triager    Negative: Lightheadedness or dizziness and persists > 10 minutes and not relieved by reassurance provided by triager    Negative: Alcohol or drug abuse, known or suspected, and feeling very shaky (i.e., visible tremors of hands)    Negative: Patient sounds very sick or weak to the triager    Protocols used: ANXIETY AND PANIC ATTACK-A-OH

## 2021-05-14 ASSESSMENT — ANXIETY QUESTIONNAIRES: GAD7 TOTAL SCORE: 18

## 2021-05-26 ENCOUNTER — RECORDS - HEALTHEAST (OUTPATIENT)
Dept: ADMINISTRATIVE | Facility: CLINIC | Age: 51
End: 2021-05-26

## 2021-05-26 VITALS — HEART RATE: 90 BPM | SYSTOLIC BLOOD PRESSURE: 130 MMHG | DIASTOLIC BLOOD PRESSURE: 83 MMHG | OXYGEN SATURATION: 100 %

## 2021-05-27 ENCOUNTER — RECORDS - HEALTHEAST (OUTPATIENT)
Dept: ADMINISTRATIVE | Facility: CLINIC | Age: 51
End: 2021-05-27

## 2021-05-27 ASSESSMENT — PATIENT HEALTH QUESTIONNAIRE - PHQ9: SUM OF ALL RESPONSES TO PHQ QUESTIONS 1-9: 6

## 2021-05-28 ENCOUNTER — RECORDS - HEALTHEAST (OUTPATIENT)
Dept: ADMINISTRATIVE | Facility: CLINIC | Age: 51
End: 2021-05-28

## 2021-05-28 ASSESSMENT — ANXIETY QUESTIONNAIRES: GAD7 TOTAL SCORE: 7

## 2021-05-29 ENCOUNTER — RECORDS - HEALTHEAST (OUTPATIENT)
Dept: ADMINISTRATIVE | Facility: CLINIC | Age: 51
End: 2021-05-29

## 2021-05-30 ENCOUNTER — HEALTH MAINTENANCE LETTER (OUTPATIENT)
Age: 51
End: 2021-05-30

## 2021-05-31 ENCOUNTER — RECORDS - HEALTHEAST (OUTPATIENT)
Dept: ADMINISTRATIVE | Facility: CLINIC | Age: 51
End: 2021-05-31

## 2021-05-31 VITALS — BODY MASS INDEX: 28.4 KG/M2 | WEIGHT: 150.44 LBS | HEIGHT: 61 IN

## 2021-05-31 NOTE — TELEPHONE ENCOUNTER
RN cannot approve Refill Request    RN can NOT refill this medication med is not covered by policy/route to provider. Last office visit: 3/29/2018 Clive Garza DO Last Physical: Visit date not found Last MTM visit: Visit date not found Last visit same specialty: 3/29/2018 Clive Garza DO.  Next visit within 3 mo: Visit date not found  Next physical within 3 mo: Visit date not found      Dayna Franco, [facility] Triage/Med Refill 9/1/2019    Requested Prescriptions   Pending Prescriptions Disp Refills     sulfamethoxazole-trimethoprim (SEPTRA) 400-80 mg per tablet [Pharmacy Med Name: SULFAMETHOXAZOLE-TMP SS TABLET] 90 tablet 3     Sig: TAKE ONE TABLET BY MOUTH ONCE DAILY       There is no refill protocol information for this order

## 2021-06-01 VITALS — HEIGHT: 61 IN | BODY MASS INDEX: 29.52 KG/M2 | WEIGHT: 156.38 LBS

## 2021-06-01 NOTE — TELEPHONE ENCOUNTER
Controlled Substance Refill Request  Medication:   Requested Prescriptions     Pending Prescriptions Disp Refills     dextroamphetamine-amphetamine (ADDERALL XR) 30 MG 24 hr capsule 30 capsule 0     Sig: Take 1 capsule (30 mg total) by mouth every morning.     Date Last Fill: 7/8/19  [id number]   Submit electronically to pharmacy  Controlled Substance Agreement on File:   Encounter-Level CSA Scan Date:    There are no encounter-level csa scan date.       Last office visit: Last office visit pertaining to requested medication was 3/29/18.

## 2021-06-01 NOTE — TELEPHONE ENCOUNTER
I can send in a refill of the medication, but this patient needs to schedule follow-up appointment.  Please help her schedule. -DE

## 2021-06-03 NOTE — PROGRESS NOTES
Assessment / Impression     1. ADHD (attention deficit hyperactivity disorder), combined type  dextroamphetamine-amphetamine (ADDERALL XR) 30 MG 24 hr capsule   2. Granulomatosis with polyangiitis with renal involvement (H)     3. Chronic kidney disease, stage 3 (H)     4. Abdominal pain, epigastric     5. Screening cholesterol level  Lipid [place]ascade       The following are part of a depression follow up plan for the patient:  mental health care assessment    Plan:     Overall, her health appears to be stable.  She will continue to follow-up with the [facility] as scheduled.  She was given a refill of the Adderall which continues to help with focus and distractibility.  The underlying cause of the 2 episodes of epigastric pain is unclear, but could be due to to hernia.  She is planning to watch this for now.  I offered a referral for her to see a surgeon which she declined for now, but will let me know if she would like to pursue this in the future.    Subjective:      HPI: Dasha Valenzuela is a 49 y.o. female who presents to the clinic for follow-up. She has a medical history significant for ADHD combined type, granulomatosis with polyangiitis, chronic pansinusitis and chronic kidney disease stage III. She was restarted on Adderall in April, 2016.  She feels like this is working well regarding her ability to focus and complete tasks. 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  She sees her specialists at [place] regarding the granulomatosis with polyangiitis on a regular basis.  She has lab orders and tubes that she needs sent to [facility].  She is not currently on rituximab infusions and she feels like things are going well.  Her kidney function has been stable.  On 4/25/2019 her creatinine was normal at 1.01, GFR 67.  Her hemogram, electrolytes, liver enzymes and ANCA lab results were normal.  She is also concerned today about a couple of episodes of epigastric pain and possible  "bulging.  The first time this occurred was when she was straining.  The second time was when she was bending forward.  Both episodes caused significant pain in the epigastric region and she is wondering if she may have a hernia.      Review of Systems  All other systems reviewed and are negative.     Social History     Tobacco Use   Smoking Status Never Smoker   Smokeless Tobacco Never Used       Family History   Problem Relation Age of Onset     Breast cancer Neg Hx        Objective:     /80 (Patient Site: Left Arm, Patient Position: Sitting, Cuff Size: Adult Regular)   Pulse 72   Temp 98  F (36.7  C) (Oral)   Resp 16   Ht 5' 0.5\" (1.537 m)   Wt 139 lb 9 oz (63.3 kg)   LMP 11/04/2019 (Approximate)   Breastfeeding No   BMI 26.81 kg/m    Physical Examination: General appearance - alert, well appearing, and in no distress  Eyes: pupils equal and reactive, extraocular eye movements intact  Mouth: mucous membranes moist, pharynx normal without lesions  Neck: supple, no significant adenopathy  Lungs: clear to auscultation, no wheezes, rales or rhonchi, symmetric air entry  Heart: normal rate, regular rhythm, normal S1, S2, no murmurs, rubs, clicks or gallops  Neurological: alert, oriented, normal speech, no focal findings or movement disorder noted.    Extremities: No edema, no clubbing or cyanosis  Psychiatric: Normal affect does not appear anxious or depressed.   Abdomen: Soft, nontender.  No obvious hernia in the epigastric region palpated  No results found for this or any previous visit (from the past 168 hour(s)).    Current Outpatient Medications   Medication Sig Note     dextroamphetamine-amphetamine (ADDERALL XR) 30 MG 24 hr capsule Take 1 capsule (30 mg total) by mouth every morning.      fluticasone (FLOVENT HFA) 220 mcg/actuation inhaler Inhale 2 puffs 2 (two) times a day.      losartan (COZAAR) 25 MG tablet Take 1 tablet (25 mg total) by mouth daily.      mupirocin (BACTROBAN) 2 % ointment  " 2/9/2016: Received from: External Pharmacy     sulfamethoxazole-trimethoprim (SEPTRA) 400-80 mg per tablet Take 1 tablet by mouth daily.

## 2021-06-04 VITALS
DIASTOLIC BLOOD PRESSURE: 80 MMHG | SYSTOLIC BLOOD PRESSURE: 122 MMHG | HEIGHT: 61 IN | HEART RATE: 72 BPM | TEMPERATURE: 98 F | RESPIRATION RATE: 16 BRPM | WEIGHT: 139.56 LBS | BODY MASS INDEX: 26.35 KG/M2

## 2021-06-04 VITALS
TEMPERATURE: 97.9 F | RESPIRATION RATE: 16 BRPM | BODY MASS INDEX: 27.19 KG/M2 | SYSTOLIC BLOOD PRESSURE: 104 MMHG | WEIGHT: 144 LBS | HEART RATE: 80 BPM | HEIGHT: 61 IN | DIASTOLIC BLOOD PRESSURE: 66 MMHG

## 2021-06-05 NOTE — PROGRESS NOTES
Assessment / Impression     1. Anxiety  sertraline (ZOLOFT) 50 MG tablet   2. Mild episode of recurrent major depressive disorder (H)     3. ADHD (attention deficit hyperactivity disorder), combined type           Plan:     We discussed treatment options and she is interested in starting medication.  We decided to start sertraline.  She is going to take 25 mg daily for the first week then increase to 50 mg daily.  I recommended she continue seeing her therapist for counseling.  She may continue to use Adderall XR 30 mg daily which continues to work well for ADHD, although she does not take this daily.  I asked her to schedule follow-up appointment with me in the next 1-2 months to check in and see how things are going.    Subjective:      HPI: Dasha Valenzuela is a 49 y.o. female who presents to the clinic to be evaluated for worsening anxiety and depression symptoms over the past few months.  She reports being under increased stress.  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  She is now working during the day instead of overnights.  She reports making this change because she in the process of splitting up with her boyfriend/partner.  They have a child together.  She denies any physical abuse, but she has noticed some anger issues that he struggles with.  She has 2 teenage daughters at home which has been somewhat stressful as well.  She also has a history of ADHD, combined type.  She is on Adderall XR 30 mg daily.  She is not taking this every day, but feels like it is helpful when she does.  She reports that her daughter has been on Zoloft and is doing well.  She thinks that her mother is also taking Zoloft.        Review of Systems  All other systems reviewed and are negative.     Social History     Tobacco Use   Smoking Status Never Smoker   Smokeless Tobacco Never Used       Family History   Problem Relation Age of Onset     Breast cancer Neg Hx  "       Objective:     /66 (Patient Site: Right Arm, Patient Position: Sitting, Cuff Size: Adult Regular)   Pulse 80   Temp 97.9  F (36.6  C) (Oral)   Resp 16   Ht 5' 0.7\" (1.542 m)   Wt 144 lb (65.3 kg)   LMP 01/01/2020 (Approximate)   Breastfeeding No   BMI 27.48 kg/m    Physical Examination: General appearance - alert, well appearing, and in no distress  Eyes: pupils equal and reactive, extraocular eye movements intact  Mouth: mucous membranes moist, pharynx normal without lesions  Neck: supple, no significant adenopathy  Lungs: clear to auscultation, no wheezes, rales or rhonchi, symmetric air entry  Heart: normal rate, regular rhythm, normal S1, S2, no murmurs, rubs, clicks or gallops  Neurological: alert, oriented, normal speech, no focal findings or movement disorder noted.    Extremities: No edema, no clubbing or cyanosis  Psychiatric: Appears mildly anxious and depressed.  PHQ-9 score 6.  She denies suicidal ideations.  HALLEY-7 score is 7.    No results found for this or any previous visit (from the past 168 hour(s)).    Current Outpatient Medications   Medication Sig Note     dextroamphetamine-amphetamine (ADDERALL XR) 30 MG 24 hr capsule Take 1 capsule (30 mg total) by mouth every morning.      fluticasone (FLOVENT HFA) 220 mcg/actuation inhaler Inhale 2 puffs 2 (two) times a day.      losartan (COZAAR) 25 MG tablet Take 1 tablet (25 mg total) by mouth daily.      mupirocin (BACTROBAN) 2 % ointment  2/9/2016: Received from: External Pharmacy     sulfamethoxazole-trimethoprim (SEPTRA) 400-80 mg per tablet Take 1 tablet by mouth daily.      sertraline (ZOLOFT) 50 MG tablet Take 1 tablet (50 mg total) by mouth daily.      "

## 2021-06-08 NOTE — PROGRESS NOTES
Dasha is a 50 year old who is being evaluated via a billable video visit.      How would you like to obtain your AVS? MyChart  If the video visit is dropped, the invitation should be resent by: Text to cell phone: [phone]  Will anyone else be joining your video visit? No      Video Start Time: 8: 59    Assessment & Plan     Anxiety  - sertraline (ZOLOFT) 100 MG tablet; Take 1.5 tablets (150 mg) by mouth daily    Severe episode of recurrent major depressive disorder, without psychotic features (H)    Psychophysiological insomnia  - traZODone (DESYREL) 50 MG tablet; Take 1-2 tablets ( mg) by mouth At Bedtime    Her anxiety depression symptoms have improved some since the last visit, but there is still room for improvement.  We decided to increase the sertraline to 150 mg daily.  She has been using the alprazolam fairly regularly at night to help sleep, especially on nights before she works.  She reports that this medication is working well, but is open to trying something different to avoid taking a benzodiazepine so regularly.  I recommended switching to trazodone.  She is going to start 50 mg and may increase to 100 mg at bedtime if needed.  She is going to follow-up in 3 months or sooner if needed.                   Return in about 3 months (around 9/10/2021).    Clive Olguin, [facility]   Dasha is a 50 year old who presents for the following health issues     HPI   She has a history significant for anxiety, depression and insomnia.  Last month we increase the sertraline from 50 mg daily to 100 mg daily.  She is tolerating this well and has noticed some improvement with anxiety depression symptoms, however, I these issues continue to be fairly significant.  She is still having some trouble falling asleep unless she takes the alprazolam.  She primarily has trouble falling asleep on nights prior to work.  The alprazolam has been very helpful with  this.    Medication Followup of sertraline 100 mg    Taking Medication as prescribed: yes    Side Effects:  None    Medication Helping Symptoms:  yes      Depression and Anxiety Follow-Up    How are you doing with your depression since your last visit? Improved but up and down     How are you doing with your anxiety since your last visit?  Improved but up and down     Are you having other symptoms that might be associated with depression or anxiety? No    Have you had a significant life event? No     Do you have any concerns with your use of alcohol or other drugs? No    Social History     Tobacco Use     Smoking status: Never Smoker     Smokeless tobacco: Never Used   Substance Use Topics     Alcohol use: Yes     Drug use: Never     PHQ 1/20/2020 5/13/2021   PHQ-9 Total Score 6 16   Q9: Thoughts of better off dead/self-harm past 2 weeks Not at all Several days     AHLLEY-7 SCORE 1/20/2020 5/13/2021   Total Score 7 18     Last PHQ-9 6/10/2021   1.  Little interest or pleasure in doing things 1   2.  Feeling down, depressed, or hopeless 1   3.  Trouble falling or staying asleep, or sleeping too much 1   4.  Feeling tired or having little energy 0   5.  Poor appetite or overeating 0   6.  Feeling bad about yourself 0   7.  Trouble concentrating 0   8.  Moving slowly or restless 0   Q9: Thoughts of better off dead/self-harm past 2 weeks 0   PHQ-9 Total Score 3   Difficulty at work, home, or with people -     HALLEY-7  6/10/2021   1. Feeling nervous, anxious, or on edge 1   2. Not being able to stop or control worrying 0   3. Worrying too much about different things 0   4. Trouble relaxing 1   5. Being so restless that it is hard to sit still 0   6. Becoming easily annoyed or irritable 1   7. Feeling afraid, as if something awful might happen 0   HALLEY-7 Total Score 3   If you checked any problems, how difficult have they made it for you to do your work, take care of things at home, or get along with other people? -      [id number]}    How many servings of fruits and vegetables do you eat daily?  4 or more    On average, how many sweetened beverages do you drink each day (Examples: soda, juice, sweet tea, etc.  Do NOT count diet or artificially sweetened beverages)?   0    How many days per week do you exercise enough to make your heart beat faster? 3 or less    How many minutes a day do you exercise enough to make your heart beat faster? 10 - 19    How many days per week do you miss taking your medication? 0        Review of Systems         Objective           Vitals:  No vitals were obtained today due to virtual visit.    Physical Exam   GENERAL: Healthy, alert and no distress  EYES: Eyes grossly normal to inspection.  No discharge or erythema, or obvious scleral/conjunctival abnormalities.  RESP: No audible wheeze, cough, or visible cyanosis.  No visible retractions or increased work of breathing.    SKIN: Visible skin clear. No significant rash, abnormal pigmentation or lesions.  NEURO: Cranial nerves grossly intact.  Mentation and speech appropriate for age.  PSYCH: Mentation appears normal, affect normal/bright, judgement and insight intact, normal speech and appearance well-groomed.                Video-Visit Details    Type of service:  Video Visit    Video End Time:9: 10    Originating Location (pt. Location): Home    Distant Location (provider location):  [facility]     Platform used for Video Visit: Jose Carlos

## 2021-06-10 ENCOUNTER — VIRTUAL VISIT (OUTPATIENT)
Dept: FAMILY MEDICINE | Facility: CLINIC | Age: 51
End: 2021-06-10
Payer: COMMERCIAL

## 2021-06-10 DIAGNOSIS — F51.04 PSYCHOPHYSIOLOGICAL INSOMNIA: ICD-10-CM

## 2021-06-10 DIAGNOSIS — F41.9 ANXIETY: Primary | ICD-10-CM

## 2021-06-10 DIAGNOSIS — F33.2 SEVERE EPISODE OF RECURRENT MAJOR DEPRESSIVE DISORDER, WITHOUT PSYCHOTIC FEATURES (H): ICD-10-CM

## 2021-06-10 PROCEDURE — 99214 OFFICE O/P EST MOD 30 MIN: CPT | Mod: GT | Performed by: FAMILY MEDICINE

## 2021-06-10 RX ORDER — TRAZODONE HYDROCHLORIDE 50 MG/1
50-100 TABLET, FILM COATED ORAL AT BEDTIME
Qty: 60 TABLET | Refills: 3 | Status: SHIPPED | OUTPATIENT
Start: 2021-06-10 | End: 2021-09-27

## 2021-06-10 RX ORDER — SERTRALINE HYDROCHLORIDE 100 MG/1
150 TABLET, FILM COATED ORAL DAILY
Qty: 135 TABLET | Refills: 1 | Status: SHIPPED | OUTPATIENT
Start: 2021-06-10 | End: 2022-05-11

## 2021-06-10 NOTE — PROGRESS NOTES
Assessment / Impression     1. Fatigue  Comprehensive Metabolic Panel    HM2(CBC w/o Differential)    Thyroid Cascade    Celiac(Gluten)Antibody Panel   2. Cold intolerance     3. Wegener's granulomatosis     4. Depressed mood     5. Chronic kidney disease, stage 3     6. ADHD (attention deficit hyperactivity disorder), combined type     7. Essential hypertension         The following are part of a depression follow up plan for the patient:  mental health care assessment    Plan:     I recommended that we check the above labs.  We discussed treatment options for her depression symptoms.  She is interested in restarting Wellbutrin.  She is concerned that SSRIs will cause weight gain.  I provided a prescription for Wellbutrin  mg daily.  She will follow-up with me in 1 month to see how things are going.  She will continue to follow-up with the [facility] as scheduled this summer.  She was given refills of her medications.  Her blood pressure is currently well controlled.  I encouraged her to eat healthy foods and get regular exercise.    Subjective:      HPI: Dasha Valenzuela is a 46 y.o. female who presents to the clinic for follow-up. She has a medical history significant for ADHD combined type, Wegener's granulomatosis hypertension, and chronic kidney disease stage III. She was restarted on Adderall in April, 2016.  She feels like this is working very well regarding her ability to focus and complete tasks. She is an ICU nurse at [employer].  She continues to struggle with fatigue symptoms.  She also describes having cold intolerance, intermittent constipation, dry skin, dry hair, difficulty losing weight and brain fog symptoms.  She is concerned that she may have a thyroid problem.  She reports that her daughter has hypothyroidism and celiac disease.  She would like to be screened for these today.  She sees her specialists at [place] regarding Wegener's granulomatosis on a regular basis.  She states  "that her next appointment will be this summer.  Last December we check labs and her kidney function was stable with a creatinine of 1.12 GFR 53.  She had thyroid studies done in 2011 which were normal.  She is also concerned today about worsening depression symptoms she has been experiencing over the past few months.  She states that this is been an issue for her off-and-on for years.  She has been on Wellbutrin a few years ago around the time when she and her  were getting  in 2012.  She remembers that it caused some ear ringing symptoms.  She states that she was not on this medication for very long (2 weeks?).        Review of Systems  All other systems reviewed and are negative.     History   Smoking Status     Never Smoker   Smokeless Tobacco     Never Used       Family History   Problem Relation Age of Onset     No Medical Problems Mother      No Medical Problems Father      No Medical Problems Sister      No Medical Problems Daughter      No Medical Problems Maternal Grandmother      No Medical Problems Maternal Grandfather      No Medical Problems Paternal Grandmother      No Medical Problems Paternal Grandfather      No Medical Problems Maternal Aunt      No Medical Problems Paternal Aunt      BRCA 1/2 Neg Hx      Breast cancer Neg Hx      Cancer Neg Hx      Colon cancer Neg Hx      Endometrial cancer Neg Hx      Ovarian cancer Neg Hx        Objective:     /62 (Patient Site: Left Arm, Patient Position: Sitting, Cuff Size: Adult Regular)  Pulse 88  Temp 98.2  F (36.8  C) (Oral)   Resp 20  Ht 5' 0.5\" (1.537 m)  Wt 150 lb 7 oz (68.2 kg)  LMP 05/02/2017 (Approximate)  Breastfeeding? No  BMI 28.9 kg/m2  Physical Examination: General appearance - alert, well appearing, and in no distress  Eyes: pupils equal and reactive, extraocular eye movements intact  Mouth: mucous membranes moist, pharynx normal without lesions  Neck: supple, no significant adenopathy  Lungs: clear to auscultation, " no wheezes, rales or rhonchi, symmetric air entry  Heart: normal rate, regular rhythm, normal S1, S2, no murmurs, rubs, clicks or gallops  Neurological: alert, oriented, normal speech, no focal findings or movement disorder noted.    Extremities: No edema, no clubbing or cyanosis  Psychiatric: Appears depressed and tearful at times during the exam.  PHQ-9 score is 8.    Recent Results (from the past 168 hour(s))   HM2(CBC w/o Differential)   Result Value Ref Range    WBC 4.0 4.0 - 11.0 thou/uL    RBC 3.93 3.80 - 5.40 mill/uL    Hemoglobin 13.2 12.0 - 16.0 g/dL    Hematocrit 38.8 35.0 - 47.0 %    MCV 99 80 - 100 fL    MCH 33.6 27.0 - 34.0 pg    MCHC 34.1 32.0 - 36.0 g/dL    RDW 11.4 11.0 - 14.5 %    Platelets 150 140 - 440 thou/uL    MPV 7.3 7.0 - 10.0 fL       Current Outpatient Prescriptions   Medication Sig Note     azaTHIOprine (IMURAN) 50 mg tablet Take 3 tablets (150 mg total) by mouth daily.      dextroamphetamine-amphetamine (ADDERALL XR) 30 MG 24 hr capsule Take 1 capsule (30 mg total) by mouth every morning.      fluticasone (FLOVENT HFA) 220 mcg/actuation inhaler Inhale 2 puffs 2 (two) times a day.      losartan (COZAAR) 25 MG tablet Take 1 tablet (25 mg total) by mouth daily.      mupirocin (BACTROBAN) 2 % ointment  2/9/2016: Received from: External Pharmacy     pantoprazole (PROTONIX) 40 MG tablet Take 1 tablet (40 mg total) by mouth daily.      sulfamethoxazole-trimethoprim (SEPTRA) 400-80 mg per tablet Take 1 tablet by mouth daily.      buPROPion (WELLBUTRIN XL) 150 MG 24 hr tablet Take 1 tablet (150 mg total) by mouth every morning.

## 2021-06-13 NOTE — PROGRESS NOTES
Assessment / Impression     1. Cough  Symptomatic COVID-19 Virus (CORONAVIRUS) PCR   2. Granulomatosis with polyangiitis with renal involvement (H)  predniSONE (DELTASONE) 20 MG tablet    albuterol (PROVENTIL) 2.5 mg /3 mL (0.083 %) nebulizer solution    budesonide (PULMICORT) 0.5 mg/2 mL nebulizer solution   3. Subglottic stenosis     4. Stage 3a chronic kidney disease           Plan:     I recommended she start prednisone 5 days she was given prescriptions for albuterol and budesonide nebs that she can do at home.  She is planning to see her pulmonologist at the [facility] next week.  We went ahead and did a Covid test as well and she will be notified of the results when available.  It is reassuring that her oxygen saturation is 100% on room air.  If breathing symptoms become acutely worse she will seek medical attention right away.    Subjective:      HPI: Dasha Valenzuela is a 50 y.o. female who presents to the clinic to be evaluated for throat irritation and coughing symptoms.  She has a history significant for granulomatosis with polyangiitis and renal involvement (Wegener's) and subglottic stenosis.  He feels like this issue is flared up.  She denies feeling particularly ill.  She is not having fevers.  She notices some wheezing symptoms.  Her cough is primarily at night.  She describes this as a chronic issue related to chronic sinusitis problems.  She is not acutely short of breath.  Denies any known Covid exposures.  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  He tried using an inhaler recently which seemed to help.  She reports being on steroids and nebs in the past which seem to be helpful.        Review of Systems  All other systems reviewed and are negative.     Social History     Tobacco Use   Smoking Status Never Smoker   Smokeless Tobacco Never Used       Family History   Problem Relation Age of Onset     Breast cancer Neg Hx        Objective:     /83   Pulse 90   SpO2  100%   Physical Examination: General appearance - alert, well appearing, and in no distress  Eyes: pupils equal and reactive, extraocular eye movements intact  Mouth: mucous membranes moist, pharynx normal without lesions  Neck: supple, no significant adenopathy  Lungs: clear to auscultation, no wheezes, rales or rhonchi, symmetric air entry  Heart: normal rate, regular rhythm, normal S1, S2, no murmurs, rubs, clicks or gallops  Neurological: alert, oriented, normal speech, no focal findings or movement disorder noted.    Extremities: No edema, no clubbing or cyanosis  Psychiatric: Normal affect. Does not appear anxious or depressed.    No results found for this or any previous visit (from the past 168 hour(s)).    Current Outpatient Medications   Medication Sig Note     albuterol (PROVENTIL) 2.5 mg /3 mL (0.083 %) nebulizer solution Take 3 mL (2.5 mg total) by nebulization every 4 (four) hours as needed for wheezing.      budesonide (PULMICORT) 0.5 mg/2 mL nebulizer solution Take 2 mL (0.5 mg total) by nebulization 2 (two) times a day.      dextroamphetamine-amphetamine (ADDERALL XR) 30 MG 24 hr capsule Take 1 capsule (30 mg total) by mouth every morning.      fluticasone (FLOVENT HFA) 220 mcg/actuation inhaler Inhale 2 puffs 2 (two) times a day.      losartan (COZAAR) 25 MG tablet Take 1 tablet (25 mg total) by mouth daily.      mupirocin (BACTROBAN) 2 % ointment  2/9/2016: Received from: External Pharmacy     predniSONE (DELTASONE) 20 MG tablet Take 40 mg by mouth daily for 5 days.      sertraline (ZOLOFT) 100 MG tablet Take 1.5 tablets (150 mg total) by mouth daily.      sulfamethoxazole-trimethoprim (SEPTRA) 400-80 mg per tablet Take 1 tablet by mouth daily.

## 2021-06-13 NOTE — TELEPHONE ENCOUNTER
RN cannot approve Refill Request    RN can NOT refill this medication No established PCP. Last office visit: 11/19/2020 Clive Garza DO Last Physical: Visit date not found Last MTM visit: Visit date not found Last visit same specialty: 11/19/2020 Clive Garza DO.  Next visit within 3 mo: Visit date not found  Next physical within 3 mo: Visit date not found      Marah Galan, [facility] Connection Triage/Med Refill 12/13/2020    Requested Prescriptions   Pending Prescriptions Disp Refills     budesonide (PULMICORT) 0.5 mg/2 mL nebulizer solution [Pharmacy Med Name: BUDESONIDE 0.5 MG/2 ML SUSP] 120 mL 2     Sig: TAKE 2 ML (0.5 MG TOTAL) BY NEBULIZATION 2 (TWO) TIMES A DAY.       Asthma Medications Refill Protocol Passed - 12/11/2020  9:30 AM        Passed - PCP or prescribing provider visit in last year     Last office visit with prescriber/PCP: 11/19/2020 Clive Garza DO OR same dept: 11/19/2020 Clive Garza DO OR same specialty: 11/19/2020 Clive Garza DO  Last physical: Visit date not found Last MTM visit: Visit date not found    Next appt within 3 mo: Visit date not found Next physical within 3 mo: Visit date not found  Prescriber OR PCP: Clive Olguin DO  Last diagnosis associated with med order: 1. Granulomatosis with polyangiitis with renal involvement (H)  - budesonide (PULMICORT) 0.5 mg/2 mL nebulizer solution [Pharmacy Med Name: BUDESONIDE 0.5 MG/2 ML SUSP]; Take 2 mL (0.5 mg total) by nebulization 2 (two) times a day.  Dispense: 120 mL; Refill: 2    If protocol passes may refill for 6 months if within 3 months of last provider visit (or a total of 9 months).

## 2021-06-17 NOTE — PROGRESS NOTES
Assessment / Impression     1. Granulomatosis with polyangiitis  KIT to [facility] - - Misc. Lab Test   2. Chronic kidney disease, stage 3     3. ADHD (attention deficit hyperactivity disorder), combined type     4. Chronic pansinusitis     5. GERD (gastroesophageal reflux disease)     6. Vitamin D deficiency         The following are part of a depression follow up plan for the patient:  mental health care assessment    Plan:     Overall, her health appears to be stable.  She will continue to follow-up with the [facility] and ENT as scheduled.  She was given refills of her medications.  The Adderall continues to help with focus and distractibility.  Is planning to discuss cutting back on the pantoprazole or switching to a different medication with her specialist at the [facility].  She is starting a vitamin D supplement since her level was recently discovered to be low at her recent visit.    Subjective:      HPI: Dasha Valenzuela is a 47 y.o. female who presents to the clinic for follow-up. She has a medical history significant for ADHD combined type, granulomatosis with polyangiitis, GERD, chronic pansinusitis, vitamin D deficiency and chronic kidney disease stage III. She was restarted on Adderall in April, 2016.  She feels like this is working very well regarding her ability to focus and complete tasks. She is an ICU nurse at [employer]. She sees her specialists at [place] regarding the granulomatosis with polyangiitis on a regular basis.  She was started on rituximab infusions this past year.  Her kidney function has been stable.  Last summer her creatinine was 1.2, GFR 49.  She has been seeing ENT for the sinusitis issues.  She was recently given a course of clindamycin which did not provide significant relief.  Overall, she feels like her health is stable.  Her mood has been better since restarting Adderall.  She is no longer on Wellbutrin.        Review of Systems  All other systems reviewed and are  "negative.     History   Smoking Status     Never Smoker   Smokeless Tobacco     Never Used       Family History   Problem Relation Age of Onset     Breast cancer Neg Hx        Objective:     /72 (Patient Site: Right Arm, Patient Position: Sitting, Cuff Size: Adult Regular)  Pulse 80  Temp 97.8  F (36.6  C) (Oral)   Resp 20  Ht 5' 0.7\" (1.542 m)  Wt 156 lb 6 oz (70.9 kg)  LMP 03/28/2018 (Exact Date)  Breastfeeding? No  BMI 29.84 kg/m2  Physical Examination: General appearance - alert, well appearing, and in no distress  Eyes: pupils equal and reactive, extraocular eye movements intact  Mouth: mucous membranes moist, pharynx normal without lesions  Neck: supple, no significant adenopathy  Lungs: clear to auscultation, no wheezes, rales or rhonchi, symmetric air entry  Heart: normal rate, regular rhythm, normal S1, S2, no murmurs, rubs, clicks or gallops  Neurological: alert, oriented, normal speech, no focal findings or movement disorder noted.    Extremities: No edema, no clubbing or cyanosis  Psychiatric: Normal affect does not appear anxious or depressed.  PHQ-9 score is 3.    No results found for this or any previous visit (from the past 168 hour(s)).    Current Outpatient Prescriptions   Medication Sig Note     dextroamphetamine-amphetamine (ADDERALL XR) 30 MG 24 hr capsule Take 1 capsule (30 mg total) by mouth every morning.      fluticasone (FLOVENT HFA) 220 mcg/actuation inhaler Inhale 2 puffs 2 (two) times a day.      losartan (COZAAR) 25 MG tablet Take 1 tablet (25 mg total) by mouth daily.      mupirocin (BACTROBAN) 2 % ointment  2/9/2016: Received from: External Pharmacy     pantoprazole (PROTONIX) 40 MG tablet Take 1 tablet (40 mg total) by mouth daily.      sulfamethoxazole-trimethoprim (SEPTRA) 400-80 mg per tablet Take 1 tablet by mouth daily.        "

## 2021-06-17 NOTE — TELEPHONE ENCOUNTER
Last OV 11/19/2020  Dr Uzma Olguin    Nothing scheduled     sertraline (ZOLOFT) 100 MG tablet 135 tablet 3 4/30/2020     Teed up request - sent message to [facility] with New PCP

## 2021-06-19 NOTE — LETTER
Letter by Clive Garza DO at      Author: Clive Garza DO Service: -- Author Type: --    Filed:  Encounter Date: 9/9/2019 Status: (Other)         Dasha Valenzuela  [id number] [facility] [id number]      September 12, 2019      Dear Ms. Valenzuela,    Our records indicate you are due for an appointment for further refills of your requested medication you can schedule by calling [phone] or submit an appointment request via [employer]. We look forward to seeing you soon!        Sincerely,        Electronically signed by Clive Olguin DO

## 2021-06-19 NOTE — LETTER
Letter by Clive Garza DO at      Author: Clive Garza DO Service: -- Author Type: --    Filed:  Encounter Date: 11/14/2019 Status: Signed         [facility]  11/14/19    Patient: Dasha Valenzuela  YOB: 1970  Medical Record Number: [id number]  CSN: [id number]                                                                              Non-opioid Controlled Substance Agreement    I understand that my care provider has prescribed a controlled substance to help manage my condition(s). I am taking this medicine to help me function or work. I know this is strong medicine, and that it can cause serious side effects. Controlled substances can be sedating, addicting and may cause a dependency on the drug. They can affect my ability to drive or think, and cause depression. They need to be taken exactly as prescribed. Combining controlled substances with certain medicines or chemicals (such as cocaine, sedatives and tranquilizers, sleeping pills, meth) can be dangerous or even fatal. Also, if I stop controlled substances suddenly, I may have severe withdrawal symptoms.  If not helpful, I may be asked to stop them.    The risks, benefits, and side effects of these medicine(s) were explained to me. I agree that:    1. I will take part in other treatments as advised by my care team. This may be psychiatry or counseling, physical therapy, behavioral therapy, group treatment or a referral to a pain clinic. I will reduce or stop my medicine when my care team tells me to do so.  2. I will take my medicines as prescribed. I will not change the dose or schedule unless my care team tells me to. There will be no refills if I run out early.  I may be contactedwithout warning and asked to complete a urine drug test or pill count at any time.   3. I will keep all my appointments, and understand this is part of the monitoring of controlled substances. My care team may  require an office visit for EVERY controlled substance refill. If I miss appointments or dont follow instructions, my care team may stop my medicine.  4. I will not ask other providers to prescribe controlled substances, and I will not accept controlled substances from other people. If I need another prescribed controlled substance for a new reason, I will tell my care team within 1 business day.  5. I will use one pharmacy to fill all of my controlled substance prescriptions, and it is up to me to make sure that I do not run out of my medicines on weekends or holidays. If my care team is willing to refill my controlled substance prescription without a visit, I must request refills only during office hours, refills may take up to 3 days to process, and it may take up to 5 to 7 days for my medicine to be mailed and ready at my pharmacy. Prescriptions will not be mailed anywhere except my pharmacy.    6. I am responsible for my prescriptions. If the medicine/prescription is lost or stolen, it will not be replaced. I also agree not to share controlled substance medicines with anyone.          [facility]  11/14/19  Patient:  Dasha Valenzuela  YOB: 1970  Medical Record Number: [id number]  CSN: [id number]    7. I agree to not use ANY illegal or recreational drugs. This includes marijuana, cocaine, bath salts or other drugs. I agree not to use alcohol unless my care team says I may. I agree to give urine samples whenever asked. If I dont give a urine sample, the care team may stop my medicine.    8. If I enroll in the [place] Medical Marijuana program, I will tell my care team. I will also sign an agreement to share my medical records with my care team.    9. I will bring in my list of medicines (or my medicine bottles) each time I come to the clinic.   10. I will tell my care team right away if I become pregnant or have a new medical problem treated outside of my regular clinic.  11. I  understand that this medicine can affect my thinking and judgment. It may be unsafe for me to drive, use machinery and do dangerous tasks. I will not do any of these things until I know how the medicine affects me. If my dose changes, I will wait to see how it affects me. I will contact my care team if I have concerns about medicine side effects.    I understand that if I do not follow any of the conditions above, my prescriptions or treatment may be stopped.      I agree that my provider, clinic care team, and pharmacy may work with any city, state or federal law enforcement agency that investigates the misuse, sale, or other diversion of my controlled medicine. I will allow my provider to discuss my care with or share a copy of this agreement with any other treating provider, pharmacy or emergency room where I receive care. I agree to give up (waive) any right of privacy or confidentiality with respect to these consents.   I have read this agreement and have asked questions about anything I did not understand.    ___________________________________________________________________________  Patient signature - Date/Time  -Dasha Valenzuela                                      ___________________________________________________________________________  Witness signature                                                                    ___________________________________________________________________________  Provider signature- Clive Olguin, DO

## 2021-06-25 NOTE — TELEPHONE ENCOUNTER
RN cannot approve Refill Request    RN can NOT refill this medication med is not covered by policy/route to provider. Last office visit: 3/29/2018 Clive Garza DO Last Physical: Visit date not found Last MTM visit: Visit date not found Last visit same specialty: 3/29/2018 Clive Garza DO.  Next visit within 3 mo: Visit date not found  Next physical within 3 mo: Visit date not found      [facility] Triage/Med Refill 3/15/2019    Requested Prescriptions   Pending Prescriptions Disp Refills     dextroamphetamine-amphetamine (ADDERALL XR) 30 MG 24 hr capsule 30 capsule 0     Sig: Take 1 capsule (30 mg total) by mouth every morning.    Controlled Substances Refill Protocol Failed - 3/12/2019 12:19 AM       Failed - Route all Controlled Substance Requests to Provider       Failed - Patient has controlled substance agreement in past 12 months    Encounter-Level CSA Scan Date:    There are no encounter-level csa scan date.              Passed - Visit with PCP or prescribing provider visit in past 12 months     Last office visit with prescriber/PCP: 3/29/2018 Clive Garza DO OR same dept: 3/29/2018 Clive Garza DO OR same specialty: 3/29/2018 Clive Garza DO Last physical: Visit date not found Last MTM visit: Visit date not found    Next visit within 3 mo: Visit date not found  Next physical within 3 mo: Visit date not found  Prescriber OR PCP: Clive Olguin DO  Last diagnosis associated with med order: There are no diagnoses linked to this encounter.

## 2021-07-21 ENCOUNTER — RECORDS - HEALTHEAST (OUTPATIENT)
Dept: ADMINISTRATIVE | Facility: CLINIC | Age: 51
End: 2021-07-21

## 2021-07-26 DIAGNOSIS — R03.0 ELEVATED BLOOD PRESSURE READING WITHOUT DIAGNOSIS OF HYPERTENSION: Primary | ICD-10-CM

## 2021-07-28 RX ORDER — LOSARTAN POTASSIUM 25 MG/1
25 TABLET ORAL DAILY
Qty: 30 TABLET | Refills: 0 | Status: SHIPPED | OUTPATIENT
Start: 2021-07-28 | End: 2022-11-21

## 2021-07-28 NOTE — TELEPHONE ENCOUNTER
DE,    Losartan:    Routing refill request to provider for review/approval because:  Labs not current:    Sodium   Date Value Ref Range Status   03/04/2008 142 133 - 144 mmol/L Final     Potassium   Date Value Ref Range Status   03/04/2008 3.6 3.4 - 5.3 mmol/L Final     Virtual visit 6/10/21 for Anxiety.  Thanks,   Misty Christian RN

## 2021-09-19 ENCOUNTER — HEALTH MAINTENANCE LETTER (OUTPATIENT)
Age: 51
End: 2021-09-19

## 2021-11-19 ENCOUNTER — ANCILLARY PROCEDURE (OUTPATIENT)
Dept: MAMMOGRAPHY | Facility: CLINIC | Age: 51
End: 2021-11-19
Attending: OBSTETRICS & GYNECOLOGY
Payer: COMMERCIAL

## 2021-11-19 DIAGNOSIS — R92.8 OTHER ABNORMAL AND INCONCLUSIVE FINDINGS ON DIAGNOSTIC IMAGING OF BREAST: ICD-10-CM

## 2021-11-19 PROCEDURE — 76642 ULTRASOUND BREAST LIMITED: CPT | Mod: LT

## 2021-11-19 PROCEDURE — 77062 BREAST TOMOSYNTHESIS BI: CPT

## 2021-11-19 NOTE — PROGRESS NOTES
Assisted patient with scheduling [facility], and sent a scheduling request to [place] at the [facility].  They will contact patient for scheduling procedure at their location.    Patient denies further questions, verbalizes understanding and agrees with this plan.      Jenna Kinsey, RN, BSN, PHN  Breast [facility] Imaging Nurse Coordinator   [facility]  [address]  [place] [address]  [phone]

## 2021-12-02 ENCOUNTER — HOSPITAL ENCOUNTER (OUTPATIENT)
Dept: MAMMOGRAPHY | Facility: CLINIC | Age: 51
End: 2021-12-02
Attending: OBSTETRICS & GYNECOLOGY
Payer: COMMERCIAL

## 2021-12-02 DIAGNOSIS — R92.8 OTHER ABNORMAL AND INCONCLUSIVE FINDINGS ON DIAGNOSTIC IMAGING OF BREAST: ICD-10-CM

## 2021-12-02 PROCEDURE — 999N000065 MA POST PROCEDURE LEFT

## 2021-12-02 PROCEDURE — 88305 TISSUE EXAM BY PATHOLOGIST: CPT | Mod: TC | Performed by: OBSTETRICS & GYNECOLOGY

## 2021-12-02 PROCEDURE — 250N000009 HC RX 250: Performed by: OBSTETRICS & GYNECOLOGY

## 2021-12-02 PROCEDURE — 272N000715 MA STEREOTACTIC BREAST BIOPSY VACUUM LT

## 2021-12-02 RX ORDER — LIDOCAINE HYDROCHLORIDE AND EPINEPHRINE 10; 10 MG/ML; UG/ML
20 INJECTION, SOLUTION INFILTRATION; PERINEURAL ONCE
Status: COMPLETED | OUTPATIENT
Start: 2021-12-02 | End: 2021-12-02

## 2021-12-02 RX ADMIN — LIDOCAINE HYDROCHLORIDE 5 ML: 10 INJECTION, SOLUTION INFILTRATION; PERINEURAL at 13:44

## 2021-12-02 RX ADMIN — LIDOCAINE HYDROCHLORIDE AND EPINEPHRINE 20 ML: 10; 10 INJECTION, SOLUTION INFILTRATION; PERINEURAL at 13:44

## 2021-12-02 NOTE — DISCHARGE INSTRUCTIONS
After Your Breast Biopsy    Bleeding or bruising: Slight bruising is normal.  If you bleed through the bandage, put direct pressure on the breast.  If you are still bleeding after 20 minutes, call the doctor who ordered the exam.    Bandages: Keep your bandage in place until tomorrow morning.  Do not get it wet.  Leave the tape in place for two days.  On the second day, cover it with a Band-Aid.    Activity: You may shower the morning after the exam.  No heavy activity (lifting, vacuuming) for 24 hours.    Discomfort: Wear your bra overnight to support the breast.  You may take Tylenol (acetaminophen) for pain.  If you had a stereotactic of MR-directed biopsy, you may take aspirin or ibuprofen (Advil, Motrin) the morning after your biopsy, unless your doctor tells you not to.    Infection: Infection is rare.  Symptoms include fever, redness, increasing pain and fluid draining from the biopsy site.  If you have any of these symptoms, please call the doctor who ordered your exam.    Results: Results may take up to three business days.  If you have not heard your results in three days, call the Breast Center Nurse at [phone] or [phone].  In rare cases, we may need to do another biopsy.    Call the doctor who ordered your exam if:    You have bleeding that lasts more than 20 minutes.    You have pain that cannot be controlled.    You have signs of infection (fever, redness, drainage or other signs).    You have not had your results within three days.    Nurse navigator: Our nurse navigator is here to answer your questions and help you set up future clinic visits.  Please call [phone].    Thank you for choosing [facility].  Please call us if you have questions or concerns about your biopsy.

## 2021-12-07 ENCOUNTER — TELEPHONE (OUTPATIENT)
Dept: MAMMOGRAPHY | Facility: CLINIC | Age: 51
End: 2021-12-07
Payer: COMMERCIAL

## 2021-12-07 LAB
PATH REPORT.COMMENTS IMP SPEC: NORMAL
PATH REPORT.COMMENTS IMP SPEC: NORMAL
PATH REPORT.FINAL DX SPEC: NORMAL
PATH REPORT.GROSS SPEC: NORMAL
PATH REPORT.MICROSCOPIC SPEC OTHER STN: NORMAL
PATH REPORT.RELEVANT HX SPEC: NORMAL
PHOTO IMAGE: NORMAL

## 2021-12-07 PROCEDURE — 88305 TISSUE EXAM BY PATHOLOGIST: CPT | Mod: 26 | Performed by: PATHOLOGY

## 2021-12-07 NOTE — TELEPHONE ENCOUNTER
After review by Breast Center Radiologist, Dr. Meghna Sainz, Ms. Valenzuela was called,  verified, and given her 2021 Left Breast Biopsy results (Benign Breast Tissue) and recommended Follow up (6 month follow up left breast mammogram).  Biopsy site without issue or concern.   I encouraged her to contact her doctor with any further breast concerns.    Final Diagnosis   Breast, left, 4:00, 10 cm from nipple, asymmetry: Stereotactic core biopsy:  - Benign breast tissue with focal usual type ductal hyperplasia  - Negative for in situ and invasive carcinoma   - Multiple additional levels examined    Electronically signed by Lopez Feng MD on 2021 at  2:44 PM

## 2022-06-26 ENCOUNTER — HEALTH MAINTENANCE LETTER (OUTPATIENT)
Age: 52
End: 2022-06-26

## 2022-08-11 DIAGNOSIS — F51.04 PSYCHOPHYSIOLOGICAL INSOMNIA: ICD-10-CM

## 2022-08-11 RX ORDER — TRAZODONE HYDROCHLORIDE 50 MG/1
50-100 TABLET, FILM COATED ORAL AT BEDTIME
Qty: 60 TABLET | Refills: 0 | Status: SHIPPED | OUTPATIENT
Start: 2022-08-11 | End: 2022-11-21

## 2022-11-17 ENCOUNTER — NURSE TRIAGE (OUTPATIENT)
Dept: NURSING | Facility: CLINIC | Age: 52
End: 2022-11-17

## 2022-11-17 NOTE — TELEPHONE ENCOUNTER
Pulminary visit through [facility] [place] , SOB some coughing out of Flovent and Albuterol, baseline of narrow airway. Has not seen pulmonary in [place] in a wile and wondering if she can get refill of Flovent and Albuterol and possibly medrol dose pack from .  No fever, no wheezing, does feel somewhat SOB when coughing, this is normal in the past when she has run out of medications per patient.  Flovent 220 mcg 4 puffs twice a day. Albuterol inhaler  Medrol dose pack?  Was in [place] and was around people who were positive for COVID, but states she is not having COVID symptoms and did test at home Tuesday which was negative.No fever No sore throat, the only symptoms other than the breathing she is having are feeling more  tired, itchy eyes.   Pharmacy she uses is [facility] on [address].  Corinna Perez RN on 11/17/2022 at 2:46 PM        Reason for Disposition    [1] Does not meet COVID-19 EXPOSURE criteria BUT [2] caller still concerned about COVID-19 EXPOSURE    Additional Information    Negative: COVID-19 lab test positive    Negative: [1] Lives with someone known to have influenza (flu test positive) AND [2] flu-like symptoms (e.g., cough, runny nose, sore throat, SOB; with or without fever)    Negative: [1] Symptoms of COVID-19 (e.g., cough, fever, SOB, or others) AND [2] doctor (or NP/PA) diagnosed COVID-19 based on symptoms    Negative: [1] Symptoms of COVID-19 (e.g., cough, fever, SOB, or others) AND [2] lives in an area with community spread    Negative: [1] Symptoms of COVID-19 (e.g., cough, fever, SOB, or others) AND [2] within 14 days of EXPOSURE (close contact) with diagnosed or suspected COVID-19 patient    Negative: [1] Symptoms of COVID-19 (e.g., cough, fever, SOB, or others) AND [2] within 14 days of travel from high-risk area for COVID-19 community spread (identified by CDC)    Negative: [1] Difficulty breathing (shortness of breath) occurs AND [2] onset > 14 days after COVID-19 EXPOSURE  (Close Contact)    Negative: [1] Cough occurs AND [2] onset > 14 days after COVID-19 EXPOSURE    Negative: [1] Common cold symptoms AND [2] onset > 14 days after COVID-19 EXPOSURE    Negative: COVID-19 vaccine reaction suspected (e.g., fever, headache, muscle aches) occurring during days 1-3 after getting vaccine    Negative: COVID-19 vaccine, questions about    Negative: [1] CLOSE CONTACT COVID-19 EXPOSURE within last 14 days AND [2] needs COVID-19 lab test to return to work AND [3] NO symptoms    Negative: [1] CLOSE CONTACT COVID-19 EXPOSURE within last 14 days AND [2] exposed person is a  (e.g., police or paramedic) AND [3] NO symptoms    Negative: [1] CLOSE CONTACT COVID-19 EXPOSURE within last 14 days AND [2] exposed person is a healthcare worker who was NOT using all recommended personal protective equipment (e.g., a respirator-N95 mask, eye protection, gloves, and gown) AND [3] NO symptoms    Negative: [1] Living or working in a correctional facility, long-term care facility, or shelter (i.e., congregate setting; densely populated) AND [2] where an outbreak has occurred AND [3] NO symptoms    Negative: [1] CLOSE CONTACT COVID-19 EXPOSURE within last 14 days AND [2] weak immune system (e.g., HIV positive, cancer chemo, splenectomy, organ transplant, chronic steroids) AND [3] NO symptoms    Negative: [1] CLOSE CONTACT COVID-19 EXPOSURE within last 14 days AND [2] NO symptoms    Negative: [1] CLOSE CONTACT COVID-19 EXPOSURE 15 or more days ago AND [2] NO symptoms    Negative: [1] Does not meet COVID-19 EXPOSURE criteria BUT [2] living with someone who was exposed and who has no symptoms of COVID-19    Negative: [1] Travel from area with community spread (identified by CDC) AND [2] within last 14 days BUT [3] NO symptoms    Negative: [1] International travel AND [2] arrived home within last 14 days    Protocols used: CORONAVIRUS (COVID-19) EXPOSURE-A-[place]

## 2022-11-18 NOTE — TELEPHONE ENCOUNTER
Please contact this patient and see if she can do a virtual visit with me this morning to discuss her symptoms and the refills that she is requesting.  It looks like I have not seen her since June 2021 so it would be a good idea to touch base through an appointment for this.  I should be able to fit her in to my schedule if there is a good time that she is available between now and noon.  Thank you, DE

## 2022-11-21 ENCOUNTER — VIRTUAL VISIT (OUTPATIENT)
Dept: FAMILY MEDICINE | Facility: CLINIC | Age: 52
End: 2022-11-21
Payer: COMMERCIAL

## 2022-11-21 DIAGNOSIS — M31.31 GRANULOMATOSIS WITH POLYANGIITIS WITH RENAL INVOLVEMENT (H): Primary | ICD-10-CM

## 2022-11-21 DIAGNOSIS — F51.04 PSYCHOPHYSIOLOGICAL INSOMNIA: ICD-10-CM

## 2022-11-21 DIAGNOSIS — J38.6 SUBGLOTTIC STENOSIS: ICD-10-CM

## 2022-11-21 DIAGNOSIS — K21.00 GASTROESOPHAGEAL REFLUX DISEASE WITH ESOPHAGITIS, UNSPECIFIED WHETHER HEMORRHAGE: ICD-10-CM

## 2022-11-21 DIAGNOSIS — F41.9 ANXIETY: ICD-10-CM

## 2022-11-21 PROCEDURE — 99214 OFFICE O/P EST MOD 30 MIN: CPT | Mod: TEL | Performed by: FAMILY MEDICINE

## 2022-11-21 RX ORDER — TRAZODONE HYDROCHLORIDE 50 MG/1
50-100 TABLET, FILM COATED ORAL AT BEDTIME
Qty: 60 TABLET | Refills: 0 | Status: SHIPPED | OUTPATIENT
Start: 2022-11-21 | End: 2023-01-02

## 2022-11-21 RX ORDER — LOSARTAN POTASSIUM 25 MG/1
25 TABLET ORAL DAILY
Qty: 30 TABLET | Refills: 0 | Status: SHIPPED | OUTPATIENT
Start: 2022-11-21 | End: 2023-01-02

## 2022-11-21 RX ORDER — PREDNISONE 20 MG/1
TABLET ORAL
Qty: 15 TABLET | Refills: 0 | Status: SHIPPED | OUTPATIENT
Start: 2022-11-21 | End: 2023-01-02

## 2022-11-21 RX ORDER — FLUTICASONE PROPIONATE 220 UG/1
1 AEROSOL, METERED RESPIRATORY (INHALATION) 2 TIMES DAILY
Qty: 12 G | Refills: 11 | Status: SHIPPED | OUTPATIENT
Start: 2022-11-21 | End: 2023-01-02

## 2022-11-21 RX ORDER — ALBUTEROL SULFATE 90 UG/1
2 AEROSOL, METERED RESPIRATORY (INHALATION) EVERY 4 HOURS PRN
Qty: 18 G | Refills: 11 | Status: SHIPPED | OUTPATIENT
Start: 2022-11-21 | End: 2024-08-22

## 2022-11-21 RX ORDER — PANTOPRAZOLE SODIUM 40 MG/1
40 TABLET, DELAYED RELEASE ORAL
Qty: 90 TABLET | Refills: 3 | Status: SHIPPED | OUTPATIENT
Start: 2022-12-01 | End: 2023-01-02

## 2022-11-21 ASSESSMENT — ANXIETY QUESTIONNAIRES
GAD7 TOTAL SCORE: 2
5. BEING SO RESTLESS THAT IT IS HARD TO SIT STILL: NOT AT ALL
2. NOT BEING ABLE TO STOP OR CONTROL WORRYING: NOT AT ALL
7. FEELING AFRAID AS IF SOMETHING AWFUL MIGHT HAPPEN: NOT AT ALL
IF YOU CHECKED OFF ANY PROBLEMS ON THIS QUESTIONNAIRE, HOW DIFFICULT HAVE THESE PROBLEMS MADE IT FOR YOU TO DO YOUR WORK, TAKE CARE OF THINGS AT HOME, OR GET ALONG WITH OTHER PEOPLE: NOT DIFFICULT AT ALL
GAD7 TOTAL SCORE: 2
7. FEELING AFRAID AS IF SOMETHING AWFUL MIGHT HAPPEN: NOT AT ALL
GAD7 TOTAL SCORE: 2
6. BECOMING EASILY ANNOYED OR IRRITABLE: SEVERAL DAYS
4. TROUBLE RELAXING: SEVERAL DAYS
3. WORRYING TOO MUCH ABOUT DIFFERENT THINGS: NOT AT ALL
8. IF YOU CHECKED OFF ANY PROBLEMS, HOW DIFFICULT HAVE THESE MADE IT FOR YOU TO DO YOUR WORK, TAKE CARE OF THINGS AT HOME, OR GET ALONG WITH OTHER PEOPLE?: NOT DIFFICULT AT ALL
1. FEELING NERVOUS, ANXIOUS, OR ON EDGE: NOT AT ALL

## 2022-11-21 ASSESSMENT — PATIENT HEALTH QUESTIONNAIRE - PHQ9
SUM OF ALL RESPONSES TO PHQ QUESTIONS 1-9: 2
SUM OF ALL RESPONSES TO PHQ QUESTIONS 1-9: 2
10. IF YOU CHECKED OFF ANY PROBLEMS, HOW DIFFICULT HAVE THESE PROBLEMS MADE IT FOR YOU TO DO YOUR WORK, TAKE CARE OF THINGS AT HOME, OR GET ALONG WITH OTHER PEOPLE: NOT DIFFICULT AT ALL

## 2022-11-21 NOTE — PROGRESS NOTES
Dasha is a 52 year old who is being evaluated via a billable telephone visit.      What phone number would you like to be contacted at?   How would you like to obtain your AVS? 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  I recommended restarting Flovent and she was given a prescription for albuterol as well as a prednisone burst which has been helpful for her in the past.  She is going to be seeing her pulmonology specialist through the [facility] early next year.  She will let me know if symptoms do not improve in the coming weeks.  - fluticasone (FLOVENT HFA) 220 MCG/ACT inhaler; Inhale 1 puff into the lungs 2 times daily  - albuterol (PROAIR HFA/PROVENTIL HFA/VENTOLIN HFA) 108 (90 Base) MCG/ACT inhaler; Inhale 2 puffs into the lungs every 4 hours as needed for shortness of breath / dyspnea or wheezing  - predniSONE (DELTASONE) 20 MG tablet; Take 2 tablets (40 mg) daily x5 days.  Then take 1 tablet (20 mg) daily x5 days.    Psychophysiological insomnia  She was given a refill of trazodone which she uses as needed to help with insomnia symptoms.  - traZODone (DESYREL) 50 MG tablet; Take 1-2 tablets ( mg) by mouth At Bedtime    Anxiety  Anxiety symptoms are stable.  She has not been needing alprazolam lately.    Gastroesophageal reflux disease with esophagitis, unspecified whether hemorrhage  She was given a refill of pantoprazole which continues to help with GERD symptoms.  - pantoprazole (PROTONIX) 40 MG EC tablet; Take 1 tablet (40 mg) by mouth every morning (before breakfast)                 Return in about 3 months (around 2/21/2023) for Annual Exam.    Clive Olguin, [facility]    Subjective   Dasha is a 52 year old, presenting for the following health issues:  No chief complaint on file.      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  She has been on Flovent which has been helpful, but she ran out about a week ago and she has noticed worsening difficulty breathing including wheezing symptoms.  She is requesting a refill and thinks that she needs a steroid burst as well which has been helpful for her in the past.  She is also requesting a refill of albuterol.  She was unable to get scheduled with her specialist at [facility] until next February.  She also has a history of depression, anxiety and insomnia.  She needs a refill of trazodone.  She feels like these issues have improved with this.  She has been on sertraline in the past and alprazolam as needed.  She does not feel like these medications are needed right now.      Review of Systems         Objective       Vitals:  No vitals were obtained today due to virtual visit.    Physical Exam   alert and no distress  PSYCH: Alert and oriented times 3; coherent speech, normal   rate and volume, able to articulate logical thoughts, able   to abstract reason, no tangential thoughts, no hallucinations   or delusions  Her affect is normal  RESP: No cough, no audible wheezing, able to talk in full sentences, but seems mildly short of breath.  Remainder of exam unable to be completed due to telephone visits                Phone call duration: 12 minutes

## 2022-11-21 NOTE — PROGRESS NOTES
Answers for HPI/ROS submitted by the patient on 11/21/2022  If you checked off any problems, how difficult have these problems made it for you to do your work, take care of things at home, or get along with other people?: Not difficult at all  PHQ9 TOTAL SCORE: 2  HALLEY 7 TOTAL SCORE: 2  Depression/Anxiety: Anxiety  Do you have a cough?: Yes  Are you experiencing any wheezing in your chest?: Yes  Do you have any shortness of breath?: Yes  Use of rescue inhaler:: a few times a week  Taking Asthma medication as prescribed:: I do not have an asthma controller medication  Asthma triggers:: unaware of any triggers  Have you had any Emergency Room visits, Urgent Care visits, or Hospital Admissions since your last office visit?: No  Anxiety since last: : no change  Other associated symotome: : No  Significant life event: : No  Anxious:: No  Current substance use:: No  How many servings of fruits and vegetables do you eat daily?: 2-3  On average, how many sweetened beverages do you drink each day (Examples: soda, juice, sweet tea, etc.  Do NOT count diet or artificially sweetened beverages)?: 0  How many minutes a day do you exercise enough to make your heart beat faster?: 9 or less  How many days a week do you exercise enough to make your heart beat faster?: 3 or less  How many days per week do you miss taking your medication?: 3  What makes it hard for you to take your medication every day?: remembering to take

## 2023-01-02 ENCOUNTER — ANCILLARY ORDERS (OUTPATIENT)
Dept: FAMILY MEDICINE | Facility: CLINIC | Age: 53
End: 2023-01-02

## 2023-01-02 ENCOUNTER — OFFICE VISIT (OUTPATIENT)
Dept: FAMILY MEDICINE | Facility: CLINIC | Age: 53
End: 2023-01-02
Payer: COMMERCIAL

## 2023-01-02 VITALS
DIASTOLIC BLOOD PRESSURE: 78 MMHG | SYSTOLIC BLOOD PRESSURE: 120 MMHG | RESPIRATION RATE: 16 BRPM | HEART RATE: 82 BPM | BODY MASS INDEX: 32.34 KG/M2 | OXYGEN SATURATION: 97 % | WEIGHT: 171.3 LBS | TEMPERATURE: 97.7 F | HEIGHT: 61 IN

## 2023-01-02 DIAGNOSIS — E66.09 CLASS 1 OBESITY DUE TO EXCESS CALORIES WITH SERIOUS COMORBIDITY AND BODY MASS INDEX (BMI) OF 32.0 TO 32.9 IN ADULT: ICD-10-CM

## 2023-01-02 DIAGNOSIS — Z11.4 SCREENING FOR HIV (HUMAN IMMUNODEFICIENCY VIRUS): ICD-10-CM

## 2023-01-02 DIAGNOSIS — F41.9 ANXIETY: ICD-10-CM

## 2023-01-02 DIAGNOSIS — E66.811 CLASS 1 OBESITY DUE TO EXCESS CALORIES WITH SERIOUS COMORBIDITY AND BODY MASS INDEX (BMI) OF 32.0 TO 32.9 IN ADULT: ICD-10-CM

## 2023-01-02 DIAGNOSIS — L21.9 SEBORRHEIC DERMATITIS: ICD-10-CM

## 2023-01-02 DIAGNOSIS — Z12.31 VISIT FOR SCREENING MAMMOGRAM: ICD-10-CM

## 2023-01-02 DIAGNOSIS — F51.04 PSYCHOPHYSIOLOGICAL INSOMNIA: ICD-10-CM

## 2023-01-02 DIAGNOSIS — Z00.00 ROUTINE GENERAL MEDICAL EXAMINATION AT A HEALTH CARE FACILITY: Primary | ICD-10-CM

## 2023-01-02 DIAGNOSIS — Z13.220 SCREENING CHOLESTEROL LEVEL: ICD-10-CM

## 2023-01-02 DIAGNOSIS — K21.00 GASTROESOPHAGEAL REFLUX DISEASE WITH ESOPHAGITIS, UNSPECIFIED WHETHER HEMORRHAGE: ICD-10-CM

## 2023-01-02 DIAGNOSIS — Z12.4 CERVICAL CANCER SCREENING: ICD-10-CM

## 2023-01-02 DIAGNOSIS — M31.31 GRANULOMATOSIS WITH POLYANGIITIS WITH RENAL INVOLVEMENT (H): ICD-10-CM

## 2023-01-02 DIAGNOSIS — F33.2 SEVERE EPISODE OF RECURRENT MAJOR DEPRESSIVE DISORDER, WITHOUT PSYCHOTIC FEATURES (H): ICD-10-CM

## 2023-01-02 DIAGNOSIS — Z12.11 SCREEN FOR COLON CANCER: ICD-10-CM

## 2023-01-02 DIAGNOSIS — R92.8 BI-RADS CATEGORY 3 MAMMOGRAM RESULT: ICD-10-CM

## 2023-01-02 DIAGNOSIS — Z11.59 NEED FOR HEPATITIS C SCREENING TEST: ICD-10-CM

## 2023-01-02 LAB
ALBUMIN SERPL BCG-MCNC: 4.3 G/DL (ref 3.5–5.2)
ALP SERPL-CCNC: 53 U/L (ref 35–104)
ALT SERPL W P-5'-P-CCNC: 9 U/L (ref 10–35)
ANION GAP SERPL CALCULATED.3IONS-SCNC: 15 MMOL/L (ref 7–15)
AST SERPL W P-5'-P-CCNC: 18 U/L (ref 10–35)
BILIRUB SERPL-MCNC: 0.7 MG/DL
BUN SERPL-MCNC: 23 MG/DL (ref 6–20)
CALCIUM SERPL-MCNC: 9.2 MG/DL (ref 8.6–10)
CHLORIDE SERPL-SCNC: 100 MMOL/L (ref 98–107)
CHOLEST SERPL-MCNC: 247 MG/DL
CREAT SERPL-MCNC: 1.1 MG/DL (ref 0.51–0.95)
CRP SERPL-MCNC: 14.5 MG/L
DEPRECATED HCO3 PLAS-SCNC: 21 MMOL/L (ref 22–29)
ERYTHROCYTE [DISTWIDTH] IN BLOOD BY AUTOMATED COUNT: 13.6 % (ref 10–15)
ERYTHROCYTE [SEDIMENTATION RATE] IN BLOOD BY WESTERGREN METHOD: 14 MM/HR (ref 0–30)
GFR SERPL CREATININE-BSD FRML MDRD: 61 ML/MIN/1.73M2
GLUCOSE SERPL-MCNC: 95 MG/DL (ref 70–99)
HCT VFR BLD AUTO: 43.3 % (ref 35–47)
HCV AB SERPL QL IA: NONREACTIVE
HDLC SERPL-MCNC: 75 MG/DL
HGB BLD-MCNC: 14.9 G/DL (ref 11.7–15.7)
HIV 1+2 AB+HIV1 P24 AG SERPL QL IA: NONREACTIVE
LDLC SERPL CALC-MCNC: 146 MG/DL
MCH RBC QN AUTO: 29.9 PG (ref 26.5–33)
MCHC RBC AUTO-ENTMCNC: 34.4 G/DL (ref 31.5–36.5)
MCV RBC AUTO: 87 FL (ref 78–100)
NONHDLC SERPL-MCNC: 172 MG/DL
PLATELET # BLD AUTO: 304 10E3/UL (ref 150–450)
POTASSIUM SERPL-SCNC: 3.8 MMOL/L (ref 3.4–5.3)
PROT SERPL-MCNC: 7.3 G/DL (ref 6.4–8.3)
RBC # BLD AUTO: 4.98 10E6/UL (ref 3.8–5.2)
SODIUM SERPL-SCNC: 136 MMOL/L (ref 136–145)
TRIGL SERPL-MCNC: 128 MG/DL
WBC # BLD AUTO: 6.2 10E3/UL (ref 4–11)

## 2023-01-02 PROCEDURE — 87389 HIV-1 AG W/HIV-1&-2 AB AG IA: CPT | Performed by: FAMILY MEDICINE

## 2023-01-02 PROCEDURE — 85652 RBC SED RATE AUTOMATED: CPT | Performed by: FAMILY MEDICINE

## 2023-01-02 PROCEDURE — 86140 C-REACTIVE PROTEIN: CPT | Performed by: FAMILY MEDICINE

## 2023-01-02 PROCEDURE — 86803 HEPATITIS C AB TEST: CPT | Performed by: FAMILY MEDICINE

## 2023-01-02 PROCEDURE — 80061 LIPID PANEL: CPT | Performed by: FAMILY MEDICINE

## 2023-01-02 PROCEDURE — 36415 COLL VENOUS BLD VENIPUNCTURE: CPT | Performed by: FAMILY MEDICINE

## 2023-01-02 PROCEDURE — 99396 PREV VISIT EST AGE 40-64: CPT | Performed by: FAMILY MEDICINE

## 2023-01-02 PROCEDURE — 0124A COVID-19 VACCINE BIVALENT BOOSTER 12+ (PFIZER): CPT | Performed by: FAMILY MEDICINE

## 2023-01-02 PROCEDURE — 80053 COMPREHEN METABOLIC PANEL: CPT | Performed by: FAMILY MEDICINE

## 2023-01-02 PROCEDURE — 91312 COVID-19 VACCINE BIVALENT BOOSTER 12+ (PFIZER): CPT | Performed by: FAMILY MEDICINE

## 2023-01-02 PROCEDURE — 85027 COMPLETE CBC AUTOMATED: CPT | Performed by: FAMILY MEDICINE

## 2023-01-02 PROCEDURE — 99213 OFFICE O/P EST LOW 20 MIN: CPT | Mod: 25 | Performed by: FAMILY MEDICINE

## 2023-01-02 RX ORDER — FLUTICASONE PROPIONATE 220 UG/1
1 AEROSOL, METERED RESPIRATORY (INHALATION) 2 TIMES DAILY
Qty: 36 G | Refills: 4 | Status: SHIPPED | OUTPATIENT
Start: 2023-01-02

## 2023-01-02 RX ORDER — TRAZODONE HYDROCHLORIDE 50 MG/1
50-100 TABLET, FILM COATED ORAL AT BEDTIME
Qty: 180 TABLET | Refills: 3 | Status: SHIPPED | OUTPATIENT
Start: 2023-01-02 | End: 2024-08-22

## 2023-01-02 RX ORDER — KETOCONAZOLE 20 MG/ML
SHAMPOO TOPICAL DAILY PRN
Qty: 120 ML | Refills: 1 | Status: SHIPPED | OUTPATIENT
Start: 2023-01-02

## 2023-01-02 RX ORDER — LOSARTAN POTASSIUM 25 MG/1
25 TABLET ORAL DAILY
Qty: 90 TABLET | Refills: 3 | Status: SHIPPED | OUTPATIENT
Start: 2023-01-02 | End: 2024-04-29

## 2023-01-02 RX ORDER — PANTOPRAZOLE SODIUM 40 MG/1
40 TABLET, DELAYED RELEASE ORAL
Qty: 90 TABLET | Refills: 3 | Status: SHIPPED | OUTPATIENT
Start: 2023-01-02 | End: 2024-07-22

## 2023-01-02 ASSESSMENT — ENCOUNTER SYMPTOMS
ARTHRALGIAS: 1
HEMATURIA: 0
DIARRHEA: 0
PARESTHESIAS: 0
COUGH: 1
FREQUENCY: 0
DIZZINESS: 0
MYALGIAS: 0
PALPITATIONS: 0
HEMATOCHEZIA: 0
SHORTNESS OF BREATH: 1
DYSURIA: 0
EYE PAIN: 0
CONSTIPATION: 0
WEAKNESS: 0
NAUSEA: 0
JOINT SWELLING: 0
SORE THROAT: 0
ABDOMINAL PAIN: 0
NERVOUS/ANXIOUS: 0
FEVER: 0
HEADACHES: 0
HEARTBURN: 0
CHILLS: 0
BREAST MASS: 0

## 2023-01-02 ASSESSMENT — ANXIETY QUESTIONNAIRES
1. FEELING NERVOUS, ANXIOUS, OR ON EDGE: NOT AT ALL
GAD7 TOTAL SCORE: 0
6. BECOMING EASILY ANNOYED OR IRRITABLE: NOT AT ALL
GAD7 TOTAL SCORE: 0
5. BEING SO RESTLESS THAT IT IS HARD TO SIT STILL: NOT AT ALL
IF YOU CHECKED OFF ANY PROBLEMS ON THIS QUESTIONNAIRE, HOW DIFFICULT HAVE THESE PROBLEMS MADE IT FOR YOU TO DO YOUR WORK, TAKE CARE OF THINGS AT HOME, OR GET ALONG WITH OTHER PEOPLE: NOT DIFFICULT AT ALL
GAD7 TOTAL SCORE: 0
8. IF YOU CHECKED OFF ANY PROBLEMS, HOW DIFFICULT HAVE THESE MADE IT FOR YOU TO DO YOUR WORK, TAKE CARE OF THINGS AT HOME, OR GET ALONG WITH OTHER PEOPLE?: NOT DIFFICULT AT ALL
2. NOT BEING ABLE TO STOP OR CONTROL WORRYING: NOT AT ALL
7. FEELING AFRAID AS IF SOMETHING AWFUL MIGHT HAPPEN: NOT AT ALL
3. WORRYING TOO MUCH ABOUT DIFFERENT THINGS: NOT AT ALL
7. FEELING AFRAID AS IF SOMETHING AWFUL MIGHT HAPPEN: NOT AT ALL
4. TROUBLE RELAXING: NOT AT ALL

## 2023-01-02 ASSESSMENT — PATIENT HEALTH QUESTIONNAIRE - PHQ9
10. IF YOU CHECKED OFF ANY PROBLEMS, HOW DIFFICULT HAVE THESE PROBLEMS MADE IT FOR YOU TO DO YOUR WORK, TAKE CARE OF THINGS AT HOME, OR GET ALONG WITH OTHER PEOPLE: NOT DIFFICULT AT ALL
SUM OF ALL RESPONSES TO PHQ QUESTIONS 1-9: 2
SUM OF ALL RESPONSES TO PHQ QUESTIONS 1-9: 2

## 2023-01-02 ASSESSMENT — PAIN SCALES - GENERAL: PAINLEVEL: NO PAIN (0)

## 2023-01-02 NOTE — NURSING NOTE
Prior to immunization administration, verified patients identity using patient s name and date of birth. Please see Immunization Activity for additional information.     Screening Questionnaire for Adult Immunization    Are you sick today?   No   Do you have allergies to medications, food, a vaccine component or latex?   No   Have you ever had a serious reaction after receiving a vaccination?   No   Do you have a long-term health problem with heart, lung, kidney, or metabolic disease (e.g., diabetes), asthma, a blood disorder, no spleen, complement component deficiency, a cochlear implant, or a spinal fluid leak?  Are you on long-term aspirin therapy?   No   Do you have cancer, leukemia, HIV/AIDS, or any other immune system problem?   No   Do you have a parent, brother, or sister with an immune system problem?   No   In the past 3 months, have you taken medications that affect  your immune system, such as prednisone, other steroids, or anticancer drugs; drugs for the treatment of rheumatoid arthritis, Crohn s disease, or psoriasis; or have you had radiation treatments?   No   Have you had a seizure, or a brain or other nervous system problem?   No   During the past year, have you received a transfusion of blood or blood    products, or been given immune (gamma) globulin or antiviral drug?   No   For women: Are you pregnant or is there a chance you could become       pregnant during the next month?   No   Have you received any vaccinations in the past 4 weeks?   No     Immunization questionnaire answers were all negative.        Per orders of Dr. Gusman, injection of pfizer booster given by Ana Rosa Martinez. Patient instructed to remain in clinic for 15 minutes afterwards, and to report any adverse reaction to me immediately.       Screening performed by Ana Rosa Martinez on 1/2/2023 at 8:54 AM.

## 2023-01-02 NOTE — PROGRESS NOTES
SUBJECTIVE:   CC: Dasha is an 52 year old who presents for preventive health visit.     Patient has been advised of split billing requirements and indicates understanding: Yes  Healthy Habits:     Getting at least 3 servings of Calcium per day:  NO    Bi-annual eye exam:  Yes    Dental care twice a year:  Yes    Sleep apnea or symptoms of sleep apnea:  Daytime drowsiness    Diet:  Regular (no restrictions), Carbohydrate counting and Breakfast skipped    Frequency of exercise:  None    Taking medications regularly:  Yes    Medication side effects:  None    PHQ-2 Total Score: 0    Additional concerns today:  Yes    She has a history significant for granulomatosis with polyangiitis and renal involvement (Wegener's) and subglottic stenosis, insomnia, anxiety, depression, GERD and obesity.  She has been on Flovent and has albuterol available as needed which has been helpful.  She is also on trazodone and pantoprazole.  She used to be on sertraline and alprazolam when needed, but she has been able to wean off these medications.  She is concerned today about weight gain over the past few years.  She is interested in potential treatment options for this.  She mentions Ozempic as a possibility.  She also describes having some itching around her eyes and scalp.  There is some flaking of the skin on the scalp as well.  In November she was having a flare of respiratory symptoms.  I prescribed a course of prednisone and refill the Flovent which was helpful in calming this down.    Social history: Single.  She works as a nurse at [employer].  She has 3 children.      Answers for HPI/ROS submitted by the patient on 1/2/2023  If you checked off any problems, how difficult have these problems made it for you to do your work, take care of things at home, or get along with other people?: Not difficult at all  PHQ9 TOTAL SCORE: 2  HALLEY 7 TOTAL SCORE: 0              Today's PHQ-2 Score:   PHQ-2 ( 1999 Pfizer) 1/2/2023   Q1:  Little interest or pleasure in doing things 0   Q2: Feeling down, depressed or hopeless 0   PHQ-2 Score 0   PHQ-2 Total Score (12-17 Years)- Positive if 3 or more points; Administer PHQ-A if positive -   Q1: Little interest or pleasure in doing things Not at all   Q2: Feeling down, depressed or hopeless Not at all   PHQ-2 Score 0       Have you ever done Advance Care Planning? (For example, a Health Directive, POLST, or a discussion with a medical provider or your loved ones about your wishes): No, advance care planning information given to patient to review.  Patient plans to discuss their wishes with loved ones or provider.      Social History     Tobacco Use     Smoking status: Never     Smokeless tobacco: Never   Substance Use Topics     Alcohol use: Yes     If you drink alcohol do you typically have >3 drinks per day or >7 drinks per week? No    Alcohol Use 1/2/2023   Prescreen: >3 drinks/day or >7 drinks/week? No   Prescreen: >3 drinks/day or >7 drinks/week? -   No flowsheet data found.    Reviewed orders with patient.  Reviewed health maintenance and updated orders accordingly - Yes  Lab work is in process  Labs reviewed in EPIC    Breast Cancer Screening:    FHS-7:   Breast CA Risk Assessment (FHS-7) 11/19/2021 1/2/2023   Did any of your first-degree relatives have breast or ovarian cancer? No No   Did any of your relatives have bilateral breast cancer? No No   Did any man in your family have breast cancer? No No   Did any woman in your family have breast and ovarian cancer? No No   Did any woman in your family have breast cancer before age 50 y? No No   Do you have 2 or more relatives with breast and/or ovarian cancer? No No   Do you have 2 or more relatives with breast and/or bowel cancer? No No         Pertinent mammograms are reviewed under the imaging tab.      PAP / HPV 12/14/2017   HPV See Scanned Report     Reviewed and updated as needed this visit by clinical staff   Tobacco  Allergies  Meds       "        Reviewed and updated as needed this visit by Provider     Meds                 Review of Systems   Constitutional: Negative for chills and fever.   HENT: Negative for congestion, ear pain, hearing loss and sore throat.    Eyes: Negative for pain and visual disturbance.   Respiratory: Positive for cough and shortness of breath.    Cardiovascular: Negative for chest pain, palpitations and peripheral edema.   Gastrointestinal: Negative for abdominal pain, constipation, diarrhea, heartburn, hematochezia and nausea.   Breasts:  Negative for breast mass and discharge.   Genitourinary: Negative for dysuria, frequency, genital sores, hematuria, pelvic pain, urgency, vaginal bleeding and vaginal discharge.   Musculoskeletal: Positive for arthralgias. Negative for joint swelling and myalgias.   Skin: Negative for rash.   Neurological: Negative for dizziness, weakness, headaches and paresthesias.   Psychiatric/Behavioral: Negative for mood changes. The patient is not nervous/anxious.           OBJECTIVE:   /78   Pulse 82   Temp 97.7  F (36.5  C) (Temporal)   Resp 16   Ht 1.556 m (5' 1.25\")   Wt 77.7 kg (171 lb 4.8 oz)   LMP 12/31/2022 (Exact Date)   SpO2 97%   BMI 32.10 kg/m    Physical Exam  GENERAL: healthy, alert and no distress  EYES: Eyes grossly normal to inspection, PERRL and conjunctivae and sclerae normal  HENT: ear canals and TM's normal, nose and mouth without ulcers or lesions  NECK: no adenopathy, no asymmetry, masses, or scars and thyroid normal to palpation  RESP: lungs clear to auscultation - no rales, rhonchi or wheezes  CV: regular rate and rhythm, normal S1 S2, no S3 or S4, no murmur, click or rub, no peripheral edema and peripheral pulses strong  ABDOMEN: soft, nontender, no hepatosplenomegaly, no masses and bowel sounds normal  MS: no gross musculoskeletal defects noted, no edema  SKIN: There is trace erythema around the upper eyelids.  There is some slight erythema on the scalp with " flaking skin  NEURO: Normal strength and tone, mentation intact and speech normal  PSYCH: mentation appears normal, affect normal/bright    Diagnostic Test Results:  Labs reviewed in Epic    ASSESSMENT/PLAN:     1. Routine general medical examination at a health care facility  I encouraged her to get regular exercise and eat a healthy diet.  We are going to check labs today.  She reports having a sugar-free sweetener in her coffee this morning.    2. 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  She has albuterol available as needed.  She is going to be following up with her providers at [facility] later this month.  - Comprehensive metabolic panel (BMP + Alb, Alk Phos, ALT, AST, Total. Bili, TP); Future  - CBC with platelets; Future  - fluticasone (FLOVENT HFA) 220 MCG/ACT inhaler; Inhale 1 puff into the lungs 2 times daily  Dispense: 36 g; Refill: 4  - losartan (COZAAR) 25 MG tablet; Take 1 tablet (25 mg) by mouth daily  Dispense: 90 tablet; Refill: 3  - ESR: Erythrocyte sedimentation rate; Future  - CRP, inflammation; Future  - Comprehensive metabolic panel (BMP + Alb, Alk Phos, ALT, AST, Total. Bili, TP)  - CBC with platelets  - ESR: Erythrocyte sedimentation rate  - CRP, inflammation    3. Severe episode of recurrent major depressive disorder, without psychotic features (H)  This issue is stable without medication.    4. Anxiety  Anxiety symptoms are stable without medication.    5. Class 1 obesity due to excess calories with serious comorbidity and body mass index (BMI) of 32.0 to 32.9 in adult  She is interested in weight loss options.  She mentions the possibility of starting Ozempic.  This may be a good fit for her, but there is currently a shortage of this medication.  She was given a referral to comprehensive weight loss management.  - Comprehensive Weight Management; Future    6. Psychophysiological  insomnia  She was given a refill of trazodone which continues to help with this issue.  - traZODone (DESYREL) 50 MG tablet; Take 1-2 tablets ( mg) by mouth At Bedtime  Dispense: 180 tablet; Refill: 3    7. Gastroesophageal reflux disease with esophagitis, unspecified whether hemorrhage  She was given a refill of pantoprazole.  - pantoprazole (PROTONIX) 40 MG EC tablet; Take 1 tablet (40 mg) by mouth every morning (before breakfast)  Dispense: 90 tablet; Refill: 3    8. Seborrheic dermatitis  I recommended ketoconazole to be used as needed.  - ketoconazole (NIZORAL) 2 % external shampoo; Apply topically daily as needed for itching or irritation  Dispense: 120 mL; Refill: 1    9. Screen for colon cancer  - Colonoscopy Screening  Referral; Future    10. Screening for HIV (human immunodeficiency virus)  - HIV Antigen Antibody Combo; Future  - HIV Antigen Antibody Combo    11. Need for hepatitis C screening test  - Hepatitis C Screen Reflex to HCV RNA Quant and Genotype; Future  - Hepatitis C Screen Reflex to HCV RNA Quant and Genotype    12. Visit for screening mammogram  - MA SCREENING DIGITAL BILAT - Future  (s+30); Future    13. Cervical cancer screening  She declines to get a Pap smear today.  She is planning to schedule this with a different provider.    14. Screening cholesterol level  - Lipid Profile (Chol, Trig, HDL, LDL calc); Future  - Lipid Profile (Chol, Trig, HDL, LDL calc)      Patient has been advised of split billing requirements and indicates understanding: Yes      COUNSELING:  Reviewed preventive health counseling, as reflected in patient instructions       Regular exercise       Healthy diet/nutrition       Colorectal Cancer Screening       Consider Hep C screening for all patients one time for ages 18-79 years       HIV screeninx in teen years, 1x in adult years, and at intervals if high risk        She reports that she has never smoked. She has never used smokeless  tobacco.          Clive Olguin, [facility]

## 2023-01-20 ENCOUNTER — HOSPITAL ENCOUNTER (OUTPATIENT)
Dept: MAMMOGRAPHY | Facility: CLINIC | Age: 53
Discharge: HOME | End: 2023-01-20
Attending: FAMILY MEDICINE | Admitting: FAMILY MEDICINE
Payer: COMMERCIAL

## 2023-01-20 DIAGNOSIS — R92.8 BI-RADS CATEGORY 3 MAMMOGRAM RESULT: ICD-10-CM

## 2023-01-20 DIAGNOSIS — Z12.31 VISIT FOR SCREENING MAMMOGRAM: ICD-10-CM

## 2023-01-20 PROCEDURE — 77066 DX MAMMO INCL CAD BI: CPT

## 2023-03-17 ENCOUNTER — MYC MEDICAL ADVICE (OUTPATIENT)
Dept: FAMILY MEDICINE | Facility: CLINIC | Age: 53
End: 2023-03-17
Payer: COMMERCIAL

## 2023-03-23 ENCOUNTER — OFFICE VISIT (OUTPATIENT)
Dept: FAMILY MEDICINE | Facility: CLINIC | Age: 53
End: 2023-03-23
Payer: COMMERCIAL

## 2023-03-23 VITALS
HEART RATE: 71 BPM | SYSTOLIC BLOOD PRESSURE: 101 MMHG | DIASTOLIC BLOOD PRESSURE: 70 MMHG | RESPIRATION RATE: 18 BRPM | WEIGHT: 172.6 LBS | TEMPERATURE: 98 F | OXYGEN SATURATION: 98 % | BODY MASS INDEX: 32.59 KG/M2 | HEIGHT: 61 IN

## 2023-03-23 DIAGNOSIS — F33.2 SEVERE EPISODE OF RECURRENT MAJOR DEPRESSIVE DISORDER, WITHOUT PSYCHOTIC FEATURES (H): ICD-10-CM

## 2023-03-23 DIAGNOSIS — E66.811 CLASS 1 OBESITY DUE TO EXCESS CALORIES WITH SERIOUS COMORBIDITY AND BODY MASS INDEX (BMI) OF 32.0 TO 32.9 IN ADULT: ICD-10-CM

## 2023-03-23 DIAGNOSIS — Z01.818 PREOP GENERAL PHYSICAL EXAM: Primary | ICD-10-CM

## 2023-03-23 DIAGNOSIS — J32.9 CHRONIC RHINOSINUSITIS: ICD-10-CM

## 2023-03-23 DIAGNOSIS — M31.31 GRANULOMATOSIS WITH POLYANGIITIS WITH RENAL INVOLVEMENT (H): ICD-10-CM

## 2023-03-23 DIAGNOSIS — E66.09 CLASS 1 OBESITY DUE TO EXCESS CALORIES WITH SERIOUS COMORBIDITY AND BODY MASS INDEX (BMI) OF 32.0 TO 32.9 IN ADULT: ICD-10-CM

## 2023-03-23 DIAGNOSIS — F41.9 ANXIETY: ICD-10-CM

## 2023-03-23 DIAGNOSIS — K21.00 GASTROESOPHAGEAL REFLUX DISEASE WITH ESOPHAGITIS, UNSPECIFIED WHETHER HEMORRHAGE: ICD-10-CM

## 2023-03-23 PROCEDURE — 99215 OFFICE O/P EST HI 40 MIN: CPT | Performed by: FAMILY MEDICINE

## 2023-03-23 ASSESSMENT — PAIN SCALES - GENERAL: PAINLEVEL: NO PAIN (0)

## 2023-03-23 NOTE — PROGRESS NOTES
[facility]  [address], [address]  [facility] [address]  Phone: [phone]  Primary Provider: Tyler Childers  Pre-op Performing Provider: TYLER CHILDERS      PREOPERATIVE EVALUATION:  Today's date: 3/23/2023    Dasha Valenzuela is a 52 year old female who presents for a preoperative evaluation.  No flowsheet data found.  Surgical Information:  Surgery/Procedure: Ethmoidectomy Endoscopy  Surgery Location: [facility]  Surgeon: Dr Rachele Shannon  Surgery Date: 3/31/23   Time of Surgery: TBD  Where patient plans to recover: At home with family  Fax number for surgical facility: Note does not need to be faxed, will be available electronically in Epic.    Assessment & Plan     The proposed surgical procedure is considered INTERMEDIATE risk.    Preop general physical exam  She is cleared to proceed with sinus surgery as scheduled.    Chronic rhinosinusitis  She is cleared to proceed with sinus surgery as scheduled.  She will continue following up with her ENT provider.    Granulomatosis with polyangiitis with renal involvement (H)  This issue has been stable.  Labs from 1/3/2023 were stable.  She is planning to hold the losartan on the morning of surgery.    Class 1 obesity due to excess calories with serious comorbidity and body mass index (BMI) of 32.0 to 32.9 in adult  We discussed treatment options to help with weight loss.  This has been a significant struggle for her even though she has been trying to make lifestyle modifications.  We decided to start Wegovy weekly injections.  She has an appointment with a weight  coming up in a couple of months.  - Semaglutide-Weight Management (WEGOVY) 0.25 MG/0.5ML pen; Inject 0.25 mg Subcutaneous once a week    Severe episode of recurrent major depressive disorder, without psychotic features (H)  She has noticed some worsening mental health symptoms which she attributes to the difficulty  she is having with weight loss.  She is not interested in trying a medication for this today.    Anxiety  Anxiety symptoms are stable.    Gastroesophageal reflux disease with esophagitis, unspecified whether hemorrhage  Stable on pantoprazole.             Medication Instructions:   - ACE/ARB: HOLD due to exceptional risk of hypotension during surgery.     RECOMMENDATION:  APPROVAL GIVEN to proceed with proposed procedure, without further diagnostic evaluation.      40 minutes spent on the date of the encounter doing chart review, review of outside records, review of test results, patient visit and documentation       Subjective     HPI related to upcoming procedure: She has a history of chronic nasal sinus congestion and drainage.  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  She was seen by ENT at the [facility] and she had a CT scan of the sinuses on 2/2/2023 which show bilateral maxillary opacification, similar to the results from 2017.  They discussed treatment options and are planning to proceed with endoscopic ethmoidectomy.  On 1/12/2023 her CBC, fasting glucose, total bilirubin, ALT and AST results were normal.  The creatinine was 1.22, GFR 54.    She has been struggling to lose weight despite working on dietary/lifestyle adjustments.  She admits that this is having a negative impact on her mood.  She is having difficulty feeling motivated to get regular exercise.  She is feeling frustrated that her weight is not coming down despite eating healthier foods.  She is interested in medication options.  She has an appointment to see a weight loss specialist in May.    Preop Questions 3/23/2023   1. Have you ever had a heart attack or stroke? No   2. Have you ever had surgery on your heart or blood vessels, such as a stent placement, a coronary artery bypass, or surgery on an artery in your head, neck, heart, or legs? No   3. Do you have chest pain with  activity? No   4. Do you have a history of  heart failure? No   5. Do you currently have a cold, bronchitis or symptoms of other infection? No   6. Do you have a cough, shortness of breath, or wheezing? YES    7. Do you or anyone in your family have previous history of blood clots? No   8. Do you or does anyone in your family have a serious bleeding problem such as prolonged bleeding following surgeries or cuts? No   9. Have you ever had problems with anemia or been told to take iron pills? No   10. Have you had any abnormal blood loss such as black, tarry or bloody stools, or abnormal vaginal bleeding? No   11. Have you ever had a blood transfusion? No   12. Are you willing to have a blood transfusion if it is medically needed before, during, or after your surgery? Yes   13. Have you or any of your relatives ever had problems with anesthesia? No   14. Do you have sleep apnea, excessive snoring or daytime drowsiness? UNKNOWN    15. Do you have any artifical heart valves or other implanted medical devices like a pacemaker, defibrillator, or continuous glucose monitor? No   16. Do you have artificial joints? No   17. Are you allergic to latex? No   18. Is there any chance that you may be pregnant? No       Preoperative Review of :   reviewed - controlled substances reflected in medication list.          Review of Systems  CONSTITUTIONAL: NEGATIVE for fever, chills, change in weight  INTEGUMENTARY/SKIN: NEGATIVE for worrisome rashes, moles or lesions  EYES: NEGATIVE for vision changes or irritation  ENT/MOUTH: NEGATIVE for ear, mouth and throat problems  RESP: NEGATIVE for significant cough or SOB  CV: NEGATIVE for chest pain, palpitations or peripheral edema  GI: NEGATIVE for nausea, abdominal pain, heartburn, or change in bowel habits  : NEGATIVE for frequency, dysuria, or hematuria  MUSCULOSKELETAL: NEGATIVE for significant arthralgias or myalgia  NEURO: NEGATIVE for weakness, dizziness or  paresthesias  ENDOCRINE: NEGATIVE for temperature intolerance, skin/hair changes  HEME: NEGATIVE for bleeding problems  PSYCHIATRIC: NEGATIVE for changes in mood or affect    Patient Active Problem List    Diagnosis Date Noted     Granulomatosis with polyangiitis with renal involvement (H) 05/13/2021     Priority: Medium     Anxiety 05/13/2021     Priority: Medium     Severe episode of recurrent major depressive disorder, without psychotic features (H) 05/13/2021     Priority: Medium     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     Priority: Medium     Per patient- hx larygospasm        Past Medical History:   Diagnosis Date     Fibrocystic breast      Inverted nipple     Always been like that     Wegener's granulomatosis      Past Surgical History:   Procedure Laterality Date     NH TYMPANOPLASTY      Description: Tympanoplasty;  Recorded: 05/10/2011;     Current Outpatient Medications   Medication Sig Dispense Refill     albuterol (PROAIR HFA/PROVENTIL HFA/VENTOLIN HFA) 108 (90 Base) MCG/ACT inhaler Inhale 2 puffs into the lungs every 4 hours as needed for shortness of breath / dyspnea or wheezing 18 g 11     fluticasone (FLOVENT HFA) 220 MCG/ACT inhaler Inhale 1 puff into the lungs 2 times daily 36 g 4     ketoconazole (NIZORAL) 2 % external shampoo Apply topically daily as needed for itching or irritation 120 mL 1     losartan (COZAAR) 25 MG tablet Take 1 tablet (25 mg) by mouth daily 90 tablet 3     pantoprazole (PROTONIX) 40 MG EC tablet Take 1 tablet (40 mg) by mouth every morning (before breakfast) 90 tablet 3     sulfamethoxazole-trimethoprim (BACTRIM DS/SEPTRA DS) 800-160 MG tablet TAKE 1 TABLET BY MOUTH DAY       traZODone (DESYREL) 50 MG tablet Take 1-2 tablets ( mg) by mouth At Bedtime 180 tablet 3       No Known Allergies     Social History     Tobacco Use     Smoking status: Never     Smokeless tobacco: Never   Substance Use Topics     Alcohol use: Yes       History   Drug Use Unknown         Objective  "    /70   Pulse 71   Temp 98  F (36.7  C) (Temporal)   Resp 18   Ht 1.543 m (5' 0.75\")   Wt 78.3 kg (172 lb 9.6 oz)   LMP 03/04/2023 (Approximate)   SpO2 98%   BMI 32.88 kg/m      Physical Exam    GENERAL APPEARANCE: healthy, alert and no distress     EYES: EOMI,  PERRL     HENT: nose and mouth without ulcers or lesions     NECK: no adenopathy, no asymmetry, masses, or scars and thyroid normal to palpation     RESP: lungs clear to auscultation - no rales, rhonchi or wheezes     CV: regular rates and rhythm, normal S1 S2, no S3 or S4 and no murmur, click or rub     ABDOMEN:  soft, nontender, no HSM or masses and bowel sounds normal     MS: extremities normal- no gross deformities noted, no evidence of inflammation in joints, FROM in all extremities.     SKIN: no suspicious lesions or rashes     NEURO: Normal strength and tone, sensory exam grossly normal, mentation intact and speech normal     PSYCH: mentation appears normal. and affect normal/bright     LYMPHATICS: No cervical adenopathy    Recent Labs   Lab Test 01/02/23  0855   HGB 14.9         POTASSIUM 3.8   CR 1.10*        Diagnostics:  No labs were ordered during this visit.   No EKG required, no history of coronary heart disease, significant arrhythmia, peripheral arterial disease or other structural heart disease.    Revised Cardiac Risk Index (RCRI):  The patient has the following serious cardiovascular risks for perioperative complications:   - No serious cardiac risks = 0 points     RCRI Interpretation: 0 points: Class I (very low risk - 0.4% complication rate)           Signed Electronically by: Clive Olguin DO  Copy of this evaluation report is provided to requesting physician.      "

## 2023-03-23 NOTE — PATIENT INSTRUCTIONS
For informational purposes only. Not to replace the advice of your health care provider. Copyright   2003,  [employer] [facility]. All rights reserved. Clinically reviewed by Milagros Sanford MD. [employer] [id number] - REV .  Preparing for Your Surgery  Getting started  A nurse will call you to review your health history and instructions. They will give you an arrival time based on your scheduled surgery time. Please be ready to share:    Your doctor's clinic name and phone number    Your medical, surgical, and anesthesia history    A list of allergies and sensitivities    A list of medicines, including herbal treatments and over-the-counter drugs    Whether the patient has a legal guardian (ask how to send us the papers in advance)  Please tell us if you're pregnant--or if there's any chance you might be pregnant. Some surgeries may injure a fetus (unborn baby), so they require a pregnancy test. Surgeries that are safe for a fetus don't always need a test, and you can choose whether to have one.   If you have a child who's having surgery, please ask for a copy of Preparing for Your Child's Surgery.    Preparing for surgery    Within 10 to 30 days of surgery: Have a pre-op exam (sometimes called an H&P, or History and Physical). This can be done at a clinic or pre-operative center.  ? If you're having a , you may not need this exam. Talk to your care team.    At your pre-op exam, talk to your care team about all medicines you take. If you need to stop any medicines before surgery, ask when to start taking them again.  ? We do this for your safety. Many medicines can make you bleed too much during surgery. Some change how well surgery (anesthesia) drugs work.    Call your insurance company to let them know you're having surgery. (If you don't have insurance, call [phone].)    Call your clinic if there's any change in your health. This includes signs of a cold or flu (sore throat, runny nose,  cough, rash, fever). It also includes a scrape or scratch near the surgery site.    If you have questions on the day of surgery, call your hospital or surgery center.  Eating and drinking guidelines  For your safety: Unless your surgeon tells you otherwise, follow the guidelines below.    Eat and drink as usual until 8 hours before you arrive for surgery. After that, no food or milk.    Drink clear liquids until 2 hours before you arrive. These are liquids you can see through, like water, Gatorade, and Propel Water. They also include plain black coffee and tea (no cream or milk), candy, and breath mints. You can spit out gum when you arrive.    If you drink alcohol: Stop drinking it the night before surgery.    If your care team tells you to take medicine on the morning of surgery, it's okay to take it with a sip of water.  Preventing infection    Shower or bathe the night before and morning of your surgery. Follow the instructions your clinic gave you. (If no instructions, use regular soap.)    Don't shave or clip hair near your surgery site. We'll remove the hair if needed.    Don't smoke or vape the morning of surgery. You may chew nicotine gum up to 2 hours before surgery. A nicotine patch is okay.  ? Note: Some surgeries require you to completely quit smoking and nicotine. Check with your surgeon.    Your care team will make every effort to keep you safe from infection. We will:  ? Clean our hands often with soap and water (or an alcohol-based hand rub).  ? Clean the skin at your surgery site with a special soap that kills germs.  ? Give you a special gown to keep you warm. (Cold raises the risk of infection.)  ? Wear special hair covers, masks, gowns and gloves during surgery.  ? Give antibiotic medicine, if prescribed. Not all surgeries need antibiotics.  What to bring on the day of surgery    Photo ID and insurance card    Copy of your health care directive, if you have one    Glasses and hearing aids (bring  cases)  ? You can't wear contacts during surgery    Inhaler and eye drops, if you use them (tell us about these when you arrive)    CPAP machine or breathing device, if you use them    A few personal items, if spending the night    If you have . . .  ? A pacemaker, ICD (cardiac defibrillator) or other implant: Bring the ID card.  ? An implanted stimulator: Bring the remote control.  ? A legal guardian: Bring a copy of the certified (court-stamped) guardianship papers.  Please remove any jewelry, including body piercings. Leave jewelry and other valuables at home.  If you're going home the day of surgery    You must have a responsible adult drive you home. They should stay with you overnight as well.    If you don't have someone to stay with you, and you aren't safe to go home alone, we may keep you overnight. Insurance often won't pay for this.  After surgery  If it's hard to control your pain or you need more pain medicine, please call your surgeon's office.  Questions?   If you have any questions for your care team, list them here: _________________________________________________________________________________________________________________________________________________________________________ ____________________________________ ____________________________________ ____________________________________

## 2023-03-24 ENCOUNTER — TELEPHONE (OUTPATIENT)
Dept: FAMILY MEDICINE | Facility: CLINIC | Age: 53
End: 2023-03-24
Payer: COMMERCIAL

## 2023-03-24 NOTE — TELEPHONE ENCOUNTER
Prior Authorization Retail Medication Request    Medication/Dose: Semaglutide-Weight Management (WEGOVY) 0.25 MG/0.5ML pen  ICD code (if different than what is on RX):  Class 1 obesity due to excess calories with serious comorbidity and body mass index (BMI) of 32.0 to 32.9 in adult [E66.09, Z68.32]   Previously Tried and Failed:  unknown  Rationale:  unknown    Insurance Name:  [employer] fully insured  Insurance ID:  [id number]

## 2023-03-28 NOTE — TELEPHONE ENCOUNTER
Prior Authorization Approval    Authorization Effective Date: 2/26/2023  Authorization Expiration Date: 3/27/2024  Medication: Semaglutide-Weight Management (WEGOVY) 0.25 MG/0.5ML pen-APPROVED  Approved Dose/Quantity:   Reference #:     Insurance Company: [employer] - Phone [phone] Fax [phone]  Expected CoPay:       CoPay Card Available:      Foundation Assistance Needed:    Which Pharmacy is filling the prescription (Not needed for infusion/clinic administered): [facility] - [place], [place] - [facility] [address]  Pharmacy Notified: Yes  Patient Notified: No  **Instructed pharmacy to notify patient when script is ready to /ship.**

## 2023-04-17 ENCOUNTER — MYC MEDICAL ADVICE (OUTPATIENT)
Dept: FAMILY MEDICINE | Facility: CLINIC | Age: 53
End: 2023-04-17
Payer: COMMERCIAL

## 2023-04-17 DIAGNOSIS — E66.811 CLASS 1 OBESITY DUE TO EXCESS CALORIES WITH SERIOUS COMORBIDITY AND BODY MASS INDEX (BMI) OF 32.0 TO 32.9 IN ADULT: ICD-10-CM

## 2023-04-17 DIAGNOSIS — E66.09 CLASS 1 OBESITY DUE TO EXCESS CALORIES WITH SERIOUS COMORBIDITY AND BODY MASS INDEX (BMI) OF 32.0 TO 32.9 IN ADULT: ICD-10-CM

## 2023-05-18 ENCOUNTER — MYC MEDICAL ADVICE (OUTPATIENT)
Dept: FAMILY MEDICINE | Facility: CLINIC | Age: 53
End: 2023-05-18
Payer: COMMERCIAL

## 2023-05-18 DIAGNOSIS — E66.09 CLASS 1 OBESITY DUE TO EXCESS CALORIES WITH SERIOUS COMORBIDITY AND BODY MASS INDEX (BMI) OF 32.0 TO 32.9 IN ADULT: ICD-10-CM

## 2023-05-18 DIAGNOSIS — E66.811 CLASS 1 OBESITY DUE TO EXCESS CALORIES WITH SERIOUS COMORBIDITY AND BODY MASS INDEX (BMI) OF 32.0 TO 32.9 IN ADULT: ICD-10-CM

## 2023-05-18 NOTE — TELEPHONE ENCOUNTER
DE,  Please see below [employer] message and advise.  Pended order for approval      Thanks,  THAIS Ponce

## 2023-06-14 ENCOUNTER — MYC MEDICAL ADVICE (OUTPATIENT)
Dept: FAMILY MEDICINE | Facility: CLINIC | Age: 53
End: 2023-06-14
Payer: COMMERCIAL

## 2023-06-14 DIAGNOSIS — E66.09 CLASS 1 OBESITY DUE TO EXCESS CALORIES WITH SERIOUS COMORBIDITY AND BODY MASS INDEX (BMI) OF 32.0 TO 32.9 IN ADULT: Primary | ICD-10-CM

## 2023-06-14 DIAGNOSIS — E66.811 CLASS 1 OBESITY DUE TO EXCESS CALORIES WITH SERIOUS COMORBIDITY AND BODY MASS INDEX (BMI) OF 32.0 TO 32.9 IN ADULT: Primary | ICD-10-CM

## 2023-06-14 NOTE — TELEPHONE ENCOUNTER
DE,      Please see [employer] message.  Last OV 3/23/23 - preop    Pharmacy:  Pharmacy    [facility] - [place], [place] - [address]       Thank you,  Misty Christian RN

## 2023-08-12 ENCOUNTER — MYC MEDICAL ADVICE (OUTPATIENT)
Dept: FAMILY MEDICINE | Facility: CLINIC | Age: 53
End: 2023-08-12
Payer: COMMERCIAL

## 2023-08-12 DIAGNOSIS — E66.811 CLASS 1 OBESITY DUE TO EXCESS CALORIES WITH SERIOUS COMORBIDITY AND BODY MASS INDEX (BMI) OF 32.0 TO 32.9 IN ADULT: ICD-10-CM

## 2023-08-12 DIAGNOSIS — E66.09 CLASS 1 OBESITY DUE TO EXCESS CALORIES WITH SERIOUS COMORBIDITY AND BODY MASS INDEX (BMI) OF 32.0 TO 32.9 IN ADULT: ICD-10-CM

## 2023-09-13 ENCOUNTER — MYC MEDICAL ADVICE (OUTPATIENT)
Dept: FAMILY MEDICINE | Facility: CLINIC | Age: 53
End: 2023-09-13
Payer: COMMERCIAL

## 2023-11-16 ENCOUNTER — VIRTUAL VISIT (OUTPATIENT)
Dept: FAMILY MEDICINE | Facility: CLINIC | Age: 53
End: 2023-11-16
Payer: COMMERCIAL

## 2023-11-16 DIAGNOSIS — M31.31 GRANULOMATOSIS WITH POLYANGIITIS WITH RENAL INVOLVEMENT (H): ICD-10-CM

## 2023-11-16 DIAGNOSIS — F41.9 ANXIETY: ICD-10-CM

## 2023-11-16 DIAGNOSIS — E66.09 CLASS 1 OBESITY DUE TO EXCESS CALORIES WITH SERIOUS COMORBIDITY AND BODY MASS INDEX (BMI) OF 32.0 TO 32.9 IN ADULT: ICD-10-CM

## 2023-11-16 DIAGNOSIS — F33.2 SEVERE EPISODE OF RECURRENT MAJOR DEPRESSIVE DISORDER, WITHOUT PSYCHOTIC FEATURES (H): Primary | ICD-10-CM

## 2023-11-16 DIAGNOSIS — Z13.820 SCREENING FOR OSTEOPOROSIS: ICD-10-CM

## 2023-11-16 DIAGNOSIS — E66.811 CLASS 1 OBESITY DUE TO EXCESS CALORIES WITH SERIOUS COMORBIDITY AND BODY MASS INDEX (BMI) OF 32.0 TO 32.9 IN ADULT: ICD-10-CM

## 2023-11-16 PROCEDURE — 99214 OFFICE O/P EST MOD 30 MIN: CPT | Mod: VID | Performed by: FAMILY MEDICINE

## 2023-11-16 RX ORDER — SERTRALINE HYDROCHLORIDE 100 MG/1
150 TABLET, FILM COATED ORAL DAILY
Qty: 135 TABLET | Refills: 3 | Status: SHIPPED | OUTPATIENT
Start: 2023-11-16 | End: 2024-08-22

## 2023-11-16 ASSESSMENT — PATIENT HEALTH QUESTIONNAIRE - PHQ9
SUM OF ALL RESPONSES TO PHQ QUESTIONS 1-9: 7
10. IF YOU CHECKED OFF ANY PROBLEMS, HOW DIFFICULT HAVE THESE PROBLEMS MADE IT FOR YOU TO DO YOUR WORK, TAKE CARE OF THINGS AT HOME, OR GET ALONG WITH OTHER PEOPLE: SOMEWHAT DIFFICULT
SUM OF ALL RESPONSES TO PHQ QUESTIONS 1-9: 7

## 2023-11-16 ASSESSMENT — ANXIETY QUESTIONNAIRES
7. FEELING AFRAID AS IF SOMETHING AWFUL MIGHT HAPPEN: NOT AT ALL
5. BEING SO RESTLESS THAT IT IS HARD TO SIT STILL: SEVERAL DAYS
1. FEELING NERVOUS, ANXIOUS, OR ON EDGE: SEVERAL DAYS
3. WORRYING TOO MUCH ABOUT DIFFERENT THINGS: SEVERAL DAYS
4. TROUBLE RELAXING: SEVERAL DAYS
6. BECOMING EASILY ANNOYED OR IRRITABLE: SEVERAL DAYS
GAD7 TOTAL SCORE: 6
2. NOT BEING ABLE TO STOP OR CONTROL WORRYING: SEVERAL DAYS
IF YOU CHECKED OFF ANY PROBLEMS ON THIS QUESTIONNAIRE, HOW DIFFICULT HAVE THESE PROBLEMS MADE IT FOR YOU TO DO YOUR WORK, TAKE CARE OF THINGS AT HOME, OR GET ALONG WITH OTHER PEOPLE: SOMEWHAT DIFFICULT
GAD7 TOTAL SCORE: 6

## 2023-11-16 NOTE — PROGRESS NOTES
"Dasha is a 53 year old who is being evaluated via a billable video visit.      How would you like to obtain your AVS? MyChart  If the video visit is dropped, the invitation should be resent by: Text to cell phone: [phone]  Will anyone else be joining your video visit? No          Assessment & Plan     Severe episode of recurrent major depressive disorder, without psychotic features (H)  We discussed treatment options and decided to restart sertraline.  She is going to start off taking 50 mg daily and then increase to 100 mg daily after 1 month, then up to 150 mg daily after 2 months.  She is planning to schedule a physical exam in about 1-2 months so we can follow-up to make sure her mental health symptoms are stable at that time.  - sertraline (ZOLOFT) 100 MG tablet; Take 1.5 tablets (150 mg) by mouth daily    Anxiety  We are restarting sertraline today.    Class 1 obesity due to excess calories with serious comorbidity and body mass index (BMI) of 32.0 to 32.9 in adult  She reports being down to 145 pounds since starting Wegovy.  She is tolerating this well and has an appointment with the weight loss specialist coming up soon.  - Semaglutide-Weight Management (WEGOVY) 2.4 MG/0.75ML pen; Inject 2.4 mg Subcutaneous once a week    Screening for osteoporosis  - DX Hip/Pelvis/Spine; Future    Granulomatosis with polyangiitis with renal involvement (H)  She will continue following closely with her specialist at [place].             BMI:   Estimated body mass index is 32.88 kg/m  as calculated from the following:    Height as of 3/23/23: 1.543 m (5' 0.75\").    Weight as of 3/23/23: 78.3 kg (172 lb 9.6 oz).   Weight management plan: Patient referred to endocrine and/or weight management specialty    Depression Screening Follow Up        11/16/2023    11:35 AM   PHQ   PHQ-9 Total Score 7   Q9: Thoughts of better off dead/self-harm past 2 weeks Several days   F/U: Thoughts of suicide or self-harm No   F/U: Safety concerns " No         11/16/2023    11:35 AM   Last PHQ-9   1.  Little interest or pleasure in doing things 1   2.  Feeling down, depressed, or hopeless 1   3.  Trouble falling or staying asleep, or sleeping too much 0   4.  Feeling tired or having little energy 1   5.  Poor appetite or overeating 0   6.  Feeling bad about yourself 1   7.  Trouble concentrating 1   8.  Moving slowly or restless 1   Q9: Thoughts of better off dead/self-harm past 2 weeks 1   PHQ-9 Total Score 7   In the past two weeks have you had thoughts of suicide or self harm? No   Do you have concerns about your personal safety or the safety of others? No       Follow Up      Clive Olguin [facility]    Stacia Lou is a 53 year old, presenting for the following health issues:  No chief complaint on file.        11/16/2023    11:34 AM   Additional Questions   Roomed by Toño ALBARRAN     Medication Followup of Zoloft  Start taking again      She describes having worsening anxiety and depression symptoms over the past several weeks and would like to restart sertraline.  She has been off this medication for about a year and a half.  She used to be on the 150 mg dose.  She has also used alprazolam as needed in the past.  She describes having some relationship stressors which have been difficult to manage.  She is not currently seeing a therapist.    She has been taking Wegovy to help with weight loss.  She is down at least 30 pounds and feeling good with the medication.  She will be seeing a weight loss specialist for an appointment soon.    She follows at the [facility] for granulomatosis with polyangiitis with renal involvement.  They recommended she get a DEXA scan because she has been on steroids several times in the past.    Review of Systems   Constitutional, HEENT, cardiovascular, pulmonary, GI, , musculoskeletal, neuro, skin, endocrine and psych systems are negative, except as otherwise noted.       Objective           Vitals:  No vitals were obtained today due to virtual visit.    Physical Exam   GENERAL: Healthy, alert and no distress  EYES: Eyes grossly normal to inspection.  No discharge or erythema, or obvious scleral/conjunctival abnormalities.  RESP: No audible wheeze, cough, or visible cyanosis.  No visible retractions or increased work of breathing.    SKIN: Visible skin clear. No significant rash, abnormal pigmentation or lesions.  NEURO: Cranial nerves grossly intact.  Mentation and speech appropriate for age.  PSYCH: Mentation appears normal, affect mildly anxious and depressed, judgement and insight intact, normal speech and appearance well-groomed.                Video-Visit Details    Type of service:  Video Visit     Originating Location (pt. Location): Home    Distant Location (provider location):  On-site  Platform used for Video Visit: BOKU      Answers submitted by the patient for this visit:  Patient Health Questionnaire (Submitted on 11/16/2023)  If you checked off any problems, how difficult have these problems made it for you to do your work, take care of things at home, or get along with other people?: Somewhat difficult  PHQ9 TOTAL SCORE: 7

## 2023-12-01 NOTE — PROGRESS NOTES
"New Medical Weight Management Consult    PATIENT:  Dasha Valenzuela   MRN:         [id number]   :         1970  KIRBY:         2023      Dear Clive Olguin, DO,    I had the pleasure of seeing your patient, Dasha Valenzuela. Full intake/assessment was done to determine barriers to weight loss success and develop a treatment plan. Dasha Valenzuela is a 53 year old female interested in treatment of medical problems associated with excess weight. She has a height of 5' 1\", a weight of 144 lbs 0 oz, and the calculated Body mass index is 27.21 kg/m .    Assessment & Plan   Problem List Items Addressed This Visit       History of obesity - Primary     Wegovy started 2023  Prior to Wegovy: BMI 32.88, 172 lbs 9.6oz  Now BMI 27.21, 144 lbs on Wegovy  Continue Wegovy for weight maintenance         Relevant Orders    Basic metabolic panel [LAB15] (Future)    Vitamin D Deficiency [ULZ488] (Future)    Hemoglobin A1c [LAB90] (Future)    TSH with free T4 reflex    Nutrition Referral    Overweight with body mass index (BMI) of 27 to 27.9 in adult     Wegovy started 2023  Prior to Wegovy: BMI 32.88, 172 lbs 9.6oz  Now BMI 27.21, 144 lbs on Wegovy  Continue Wegovy for weight maintenance         Relevant Orders    Basic metabolic panel [LAB15] (Future)    Vitamin D Deficiency [IFP748] (Future)    Hemoglobin A1c [LAB90] (Future)    TSH with free T4 reflex    Nutrition Referral    GERD (gastroesophageal reflux disease)     Discussed in clinic visit. Anticipate improvement with weight loss.               PROGRAM OVERVIEW  Reviewed options at [facility].   All questions were answered. Education provided on chronic disease management of obesity.    SURGICAL WEIGHT LOSS   Not a candidate     MEDICATIONS:  We discussed healthy habits to assist with weight loss. We reviewed medications associated with weight gain. Discussed that medications must always be used together with lifestyle changes such as " improvements in diet choices, portion control and establishing and maintaining a regular exercise program. Reviewed rationale for long term use of pharmacotherapy in chronic disease management for obesity.      Plan to continue Wegovy from PCP - no refills needed at this time.     AOM Considerations:  Phentermine:  Able to use it on Adderall, monitor blood pressure with losartan, opioid potentiation with oxycodone, SSRI consideration with trazodone  Topiramate:  Current birth control:  IUD - copperCategory D CNS depression with oxycodone, CNS depression with trazodone, Adderall potentiation  GLP-1: prescribed Wegovy? Monitor blood sugars   Naltrexone:  Unable to use if on oxycodone  Wellbutrin:  Reduced seizure threshold with Adderall/oxycodone/trazodone, monitor response to oxycodone/trazodone  Metformin: Monitor blood sugars, may increase metformin with Bactrim/Keflex  Contrave:  Qsymia:            Hard has been on Wegovy since April from PCP has done the titration - at 2.4 mg for the past few months.     PATIENT INSTRUCTIONS:  Plan:  Consider looking into evaluation for binge eating disorder at a formal eating disorder - your screen is negative now with Wegovy, but sounds concerning prior to being on Wegovy.  Continue Wegovy from primary care for now  Stay well hydrated - Labs ordered.  Please call [phone] to set up a lab appt.   Let's get a EDGARDO on file for Dr. Cabral w/pending labs if kidney function is still elevated to discuss Wegovy/dosing moving forwards    Goals:  I recommend eating breakfast within an hour of waking up and eating every 4-6 hours during the day to keep your metabolism up. Prioritizing veggies and proteins for food choices is also recommended as medically appropriate.  Recommend 64oz water daily as medically appropriate (6-8 glasses)  Consider adding in strength training twice a week to minimize muscle loss with weight loss. Recommend pursing regular exercise. 5 minutes of exercise  "every other day or every 2 days is a great place to start.      FOLLOW-UP:    Please call [phone] to schedule your next visit in 3 months .     53 minutes spent on the date of the encounter doing chart review, history and exam, review test results, counseling, developing plan of care, documentation, and further activities as noted above.      She has the following co-morbidities:        12/7/2023     8:57 AM   --   I have the following health issues associated with obesity None of the above   I have the following symptoms associated with obesity Fatigue     Leonid has been on Wegovy since April from PCP has done the titration - at 2.4 mg for the past few months. Star        12/7/2023     8:57 AM   Referring Provider   Please name the provider who referred you to Medical Weight Management  If you do not know, please answer \"I Don't Know\" Dr. Ocasio           12/7/2023     8:57 AM   Weight History   How concerned are you about your weight? Very Concerned   I became overweight In College   The following factors have contributed to my weight gain Eating Wrong Types of Food    Eating Too Much    Lack of Exercise   I have tried the following methods to lose weight Weight Watchers    Atkins-type Diet (Low Carb/High Protein)    Meal Replacements    Fasting   My lowest weight since age 20 was 120   My highest weight since age 20 was 173   The most weight I have ever lost was (lbs) 30   I have the following family history of obesity/being overweight My mother is overweight    One or more of my siblings are overweight   How has your weight changed over the last year? Lost           12/7/2023     8:57 AM   Diet Recall Review with Patient   If you do eat lunch, what types of food do you typically eat? varies   If you do eat supper, what types of food do you typically eat? meat, veggies   If you do snack, what types of food do you typically eat? chips, chocolate candy   How many glasses of juice do you drink in a " typical day? 0   How many of glasses of milk do you drink in a typical day? 0   How many 8oz glasses of sugar containing drinks such as Hayder-Aid/sweet tea do you drink in a day? 0   How many cans/bottles of sugar pop/soda/tea/sports drinks do you drink in a day? 0   How many cans/bottles of diet pop/soda/tea or sports drink do you drink in a day? 2   How often do you have a drink of alcohol? Monthly or Less   If you do drink, how many drinks might you have in a day? 3-4     Wegovy has changed how much eating.    Meals: currently typically eating two meals - lunch/dinner. Tried intermittent fasting and noted weight loss with keto in the past.     Water: not good        12/7/2023     8:57 AM   Eating Habits   Generally, my meals include foods like these bread, pasta, rice, potatoes, corn, crackers, sweet dessert, pop, or juice Once a Week   Generally, my meals include foods like these fried meats, brats, burgers, french fries, pizza, cheese, chips, or ice cream Once a Week   Eat fast food (like McDonalds, [employer] [place], Taco Bell) Less Than Weekly   Eat at a buffet or sit-down restaurant Less Than Weekly   Eat most of my meals in front of the TV or computer Less Than Weekly   Often skip meals, eat at random times, have no regular eating times Everyday   Rarely sit down for a meal but snack or graze throughout Once a Week   Eat extra snacks between meals Less Than Weekly   Eat most of my food at the end of the day Once a Week   Eat in the middle of the night or wake up at night to eat Never   Eat extra snacks to prevent or correct low blood sugar Never   Eat to prevent acid reflux or stomach pain Never   Worry about not having enough food to eat Never   I eat when I am depressed A Few Times a Week   I eat when I am stressed A Few Times a Week   I eat when I am bored A Few Times a Week   I eat when I am anxious A Few Times a Week   I eat when I am happy or as a reward Once a Week   I feel hungry all the time even if I  just have eaten Less Than Weekly   Feeling full is important to me Once a Week   I finish all the food on my plate even if I am already full Less Than Weekly   I can't resist eating delicious food or walk past the good food/smell A Few Times a Week   I eat/snack without noticing that I am eating Never   I eat when I am preparing the meal A Few Times a Week   I eat more than usual when I see others eating A Few Times a Week   I have trouble not eating sweets, ice cream, cookies, or chips if they are around the house A Few Times a Week   I think about food all day A Few Times a Week   What foods, if any, do you crave? Sweets/Candy/Chocolate   Please list any other foods you crave? mostly carbs/junk/processed foods           12/7/2023     8:57 AM   Amount of Food   I feel out of control when eating Almost Everyday   I eat a large amount of food, like a loaf of bread, a box of cookies, a pint/quart of ice cream, all at once Monthly   I eat a large amount of food even when I am not hungry Almost Everyday   I eat rapidly Almost Everyday   I eat alone because I feel embarrassed and do not want others to see how much I have eaten Almost Everyday   I eat until I am uncomfortably full Weekly   I feel bad, disgusted, or guilty after I overeat Everyday     Feeling out of control due to sitting at home and eating driven to depression/emotional eating and eating handfuls of chips/candy but no 'stop' button and eating until feeling sick.    With Wegovy - doesn't feel she has experiences the fullness effect as when starting but is able to still stop    Denies intentionally vomiting after eating/using diuretics/laxatives or other purging type activities    Binge Eating Screening:  Objective binges at least 1x per week for the past 3 months. Has occurred maybe once in the past 3 months. Prior to Wegovy was more often. Once a week  Patient senses lack of control over eating during the binges.  Binge eating causes stress.  Binge eating  episodes are associated with 3 or more of the following:    Eating until uncomfortably full. Yes - prior would be more often, now less common  Eating large amounts when not physically hungry. yes  Eating much more rapidly than usual. no  Eating alone due to being embarassed by the amount eaten. 'easier to do when house was quiet or could be a trigger'  Feeling disgusted, depressed, or guilty after overeating. yes  If patient answers yes to 3 of the above questions and answered yes to frequency, distress of eating behavior and avoids using compensatory behaviors, refer for binge eating assessment.            12/7/2023     8:57 AM   Activity/Exercise History   How much of a typical 12 hour day do you spend sitting? Less Than Half the Day   How much of a typical 12 hour day do you spend lying down? Less Than Half the Day   How much of a typical day do you spend walking/standing? Half the Day   How many hours (not including work) do you spend on the TV/Video Games/Computer/Tablet/Phone? 2-3 Hours   How many times a week are you active for the purpose of exercise? Once a Week   What keeps you from being more active? Shortness of Breath    Unsure What To Do   How many total minutes do you spend doing some activity for the purpose of exercising when you exercise? 15-30 Minutes     Exercise is walking.     PAST MEDICAL HISTORY:  Past Medical History:   Diagnosis Date    Depressive disorder     Fibrocystic breast     Inverted nipple     Always been like that    Wegener's granulomatosis            12/7/2023     8:57 AM   Work/Social History Reviewed With Patient   My employment status is Full-Time   My job is RN   How much of your job is spent on the computer or phone? Less Than 50%   How many hours do you spend commuting to work daily? 3   What is your marital status? /In a Relationship   If in a relationship, is your significant other overweight? Yes   If you have children, are they overweight? No   Who do you live  with? Significant other and 2 of my children   Who does the food shopping? me       Social History     Tobacco Use    Smoking status: Never    Smokeless tobacco: Never   Vaping Use    Vaping Use: Never used   Substance Use Topics    Alcohol use: Yes    Drug use: Never            12/7/2023     8:57 AM   Mental Health History Reviewed With Patient   Have you ever been physically or sexually abused? No   How often in the past 2 weeks have you felt little interest or pleasure in doing things? For Several Days   Over the past 2 weeks how often have you felt down, depressed, or hopeless? For Several Days             12/7/2023     8:57 AM   Questions Reviewed With Patient   How ready are you to make changes regarding your weight? Number 1 = Not ready at all to make changes up to 10 = very ready. 10   How confident are you that you can change? 1 = Not confident that you will be successful making changes up to 10 = very confident. 6           12/7/2023     8:57 AM   Sleep History Reviewed With Patient   How many hours do you sleep at night? 8   STOP bang is low risk at 1    MEDICATIONS:   Current Outpatient Medications   Medication Sig Dispense Refill    albuterol (PROAIR HFA/PROVENTIL HFA/VENTOLIN HFA) 108 (90 Base) MCG/ACT inhaler Inhale 2 puffs into the lungs every 4 hours as needed for shortness of breath / dyspnea or wheezing 18 g 11    fluticasone (FLOVENT HFA) 220 MCG/ACT inhaler Inhale 1 puff into the lungs 2 times daily 36 g 4    ketoconazole (NIZORAL) 2 % external shampoo Apply topically daily as needed for itching or irritation 120 mL 1    losartan (COZAAR) 25 MG tablet Take 1 tablet (25 mg) by mouth daily 90 tablet 3    pantoprazole (PROTONIX) 40 MG EC tablet Take 1 tablet (40 mg) by mouth every morning (before breakfast) 90 tablet 3    Semaglutide-Weight Management (WEGOVY) 2.4 MG/0.75ML pen Inject 2.4 mg Subcutaneous once a week 3 mL 3    sertraline (ZOLOFT) 100 MG tablet Take 1.5 tablets (150 mg) by mouth  daily 135 tablet 3    sulfamethoxazole-trimethoprim (BACTRIM DS/SEPTRA DS) 800-160 MG tablet TAKE 1 TABLET BY MOUTH DAY      traZODone (DESYREL) 50 MG tablet Take 1-2 tablets ( mg) by mouth At Bedtime 180 tablet 3   Takes daily    ALLERGIES:   No Known Allergies    ROS:    HEENT  H/O glaucoma:  no  Cardiovascular  CAD:   no  Palpitations:   no  HTN:    no  Gastrointestinal  GERD:   Yes - improved and off protonix for now  Constipation:   Yes - with Wegovy but doesn't need medications  Gastroparesis:  No  Liver Dz:   no  H/O Pancreatitis:  no  H/O Gallbladder Dz: no  Psychiatric  Moods Stable:  improving  Anxiety:   yes  Depression:  yes  Bipolar:  no  H/O ETOH/Drug Abuse: no  H/O eating disorder: no  Endocrine  PMH/FMH of MTC or MEN2:  no  Neurologic:  H/O seizures:   no  Headaches:  no  Memory Impairment:  no    H/O kidney stones:  no  Kidney disease:  CKD - GPA/nephritis unclear if related  Current birth control:  IUD    LABS/RECORDS REVIEWED:  Sodium   Date Value Ref Range Status   01/02/2023 136 136 - 145 mmol/L Final   03/04/2008 142 133 - 144 mmol/L Final     Potassium   Date Value Ref Range Status   01/02/2023 3.8 3.4 - 5.3 mmol/L Final   03/04/2008 3.6 3.4 - 5.3 mmol/L Final     Chloride   Date Value Ref Range Status   01/02/2023 100 98 - 107 mmol/L Final   03/04/2008 105 94 - 109 mmol/L Final     Carbon Dioxide   Date Value Ref Range Status   03/04/2008 28 20 - 32 mmol/L Final     Carbon Dioxide (CO2)   Date Value Ref Range Status   01/02/2023 21 (L) 22 - 29 mmol/L Final     Anion Gap   Date Value Ref Range Status   01/02/2023 15 7 - 15 mmol/L Final   03/04/2008 10 6 - 17 mmol/L Final     Glucose   Date Value Ref Range Status   01/02/2023 95 70 - 99 mg/dL Final   03/04/2008 84 60 - 99 mg/dL Final     Urea Nitrogen   Date Value Ref Range Status   01/02/2023 23.0 (H) 6.0 - 20.0 mg/dL Final   03/04/2008 14 5 - 24 mg/dL Final     Creatinine   Date Value Ref Range Status   01/02/2023 1.10 (H) 0.51 - 0.95  mg/dL Final   03/04/2008 1.27 0.60 - 1.30 mg/dL Final     GFR Estimate   Date Value Ref Range Status   01/02/2023 61 >60 mL/min/1.73m2 Final     Comment:     Effective December 21, 2021 eGFRcr in adults is calculated using the 2021 CKD-EPI creatinine equation which includes age and gender (Monique et al., [place], DOI: 10.1056/FLJThh1138722)   12/14/2016 53 (L) >60 mL/min/1.73m2 Final   03/04/2008 51 (L) >60 mL/min/1.7m2 Final     Calcium   Date Value Ref Range Status   01/02/2023 9.2 8.6 - 10.0 mg/dL Final   03/04/2008 9.0 8.5 - 10.4 mg/dL Final     Bilirubin Total   Date Value Ref Range Status   01/02/2023 0.7 <=1.2 mg/dL Final     Alkaline Phosphatase   Date Value Ref Range Status   01/02/2023 53 35 - 104 U/L Final     ALT   Date Value Ref Range Status   01/02/2023 9 (L) 10 - 35 U/L Final     AST   Date Value Ref Range Status   01/02/2023 18 10 - 35 U/L Final     Cholesterol   Date Value Ref Range Status   01/02/2023 247 (H) <200 mg/dL Final     Direct Measure HDL   Date Value Ref Range Status   01/02/2023 75 >=50 mg/dL Final     LDL Cholesterol Calculated   Date Value Ref Range Status   01/02/2023 146 (H) <=100 mg/dL Final     Triglycerides   Date Value Ref Range Status   01/02/2023 128 <150 mg/dL Final     WBC Count   Date Value Ref Range Status   01/02/2023 6.2 4.0 - 11.0 10e3/uL Final     Hemoglobin   Date Value Ref Range Status   01/02/2023 14.9 11.7 - 15.7 g/dL Final     Hematocrit   Date Value Ref Range Status   01/02/2023 43.3 35.0 - 47.0 % Final     MCV   Date Value Ref Range Status   01/02/2023 87 78 - 100 fL Final     Platelet Count   Date Value Ref Range Status   01/02/2023 304 150 - 450 10e3/uL Final         BP Readings from Last 6 Encounters:   12/07/23 95/64   03/23/23 101/70   01/02/23 120/78   05/13/21 116/72   11/19/20 130/83   02/11/20 122/77       Pulse Readings from Last 6 Encounters:   12/07/23 77   03/23/23 71   01/02/23 82   05/13/21 90   11/19/20 90   02/11/20 109       PHYSICAL EXAM:  BP  "95/64   Pulse 77   Ht 5' 1\" (1.549 m)   Wt 144 lb (65.3 kg)   LMP 08/24/2023 (Within Days)   SpO2 96%   BMI 27.21 kg/m    GENERAL: Healthy, alert and no distress  EYES: Eyes grossly normal to inspection.    RESP: No audible wheeze, cough, or visible cyanosis.  No increased work of breathing.    SKIN: Visible skin clear. No significant rash, abnormal pigmentation or lesions.  NEURO: Mentation and speech appropriate for age.  PSYCH: Mentation appears normal, affect normal/bright, judgement and insight intact, normal speech and appearance well-groomed.      COUNSELING:   Reviewed obesity as a chronic disease and comprehensive management stratagies.      We discussed Bariatric Basics including:  -eating 3 meals daily  -eating protein first  -eating slowly, chewing food well    Weight Management Tips Handout given in AVS      We discussed the importance of restorative sleep and stress management in maintaining a healthy weight.  We discussed insulin resistance and glycemic index as it relates to appetite and weight control.   We discussed the importance of physical activity including cardiovascular and strength training in maintaining a healthier weight and explored viable options.  Patient education of above written in AVS.      Sincerely,    Ev Mae PA-C        "

## 2023-12-04 ENCOUNTER — PATIENT OUTREACH (OUTPATIENT)
Dept: CARE COORDINATION | Facility: CLINIC | Age: 53
End: 2023-12-04
Payer: COMMERCIAL

## 2023-12-07 ENCOUNTER — ALLIED HEALTH/NURSE VISIT (OUTPATIENT)
Dept: SURGERY | Facility: CLINIC | Age: 53
End: 2023-12-07
Payer: COMMERCIAL

## 2023-12-07 ENCOUNTER — OFFICE VISIT (OUTPATIENT)
Dept: SURGERY | Facility: CLINIC | Age: 53
End: 2023-12-07
Attending: FAMILY MEDICINE
Payer: COMMERCIAL

## 2023-12-07 VITALS
OXYGEN SATURATION: 96 % | HEART RATE: 77 BPM | WEIGHT: 144 LBS | BODY MASS INDEX: 27.19 KG/M2 | DIASTOLIC BLOOD PRESSURE: 64 MMHG | SYSTOLIC BLOOD PRESSURE: 95 MMHG | HEIGHT: 61 IN

## 2023-12-07 DIAGNOSIS — Z86.39 HISTORY OF OBESITY: Primary | ICD-10-CM

## 2023-12-07 DIAGNOSIS — K21.9 GASTROESOPHAGEAL REFLUX DISEASE WITHOUT ESOPHAGITIS: ICD-10-CM

## 2023-12-07 DIAGNOSIS — E66.3 OVERWEIGHT WITH BODY MASS INDEX (BMI) OF 27 TO 27.9 IN ADULT: Primary | ICD-10-CM

## 2023-12-07 DIAGNOSIS — E66.3 OVERWEIGHT WITH BODY MASS INDEX (BMI) OF 27 TO 27.9 IN ADULT: ICD-10-CM

## 2023-12-07 PROBLEM — N18.30 CHRONIC KIDNEY DISEASE, STAGE 3 (H): Status: ACTIVE | Noted: 2023-12-07

## 2023-12-07 PROCEDURE — 97802 MEDICAL NUTRITION INDIV IN: CPT

## 2023-12-07 PROCEDURE — 99204 OFFICE O/P NEW MOD 45 MIN: CPT | Performed by: PHYSICIAN ASSISTANT

## 2023-12-07 ASSESSMENT — SLEEP AND FATIGUE QUESTIONNAIRES
HOW LIKELY ARE YOU TO NOD OFF OR FALL ASLEEP WHILE LYING DOWN TO REST IN THE AFTERNOON WHEN CIRCUMSTANCES PERMIT: MODERATE CHANCE OF DOZING
HOW LIKELY ARE YOU TO NOD OFF OR FALL ASLEEP WHILE SITTING AND TALKING TO SOMEONE: WOULD NEVER DOZE
HOW LIKELY ARE YOU TO NOD OFF OR FALL ASLEEP IN A CAR, WHILE STOPPED FOR A FEW MINUTES IN TRAFFIC: WOULD NEVER DOZE
HOW LIKELY ARE YOU TO NOD OFF OR FALL ASLEEP WHILE SITTING AND READING: SLIGHT CHANCE OF DOZING
HOW LIKELY ARE YOU TO NOD OFF OR FALL ASLEEP WHILE WATCHING TV: SLIGHT CHANCE OF DOZING
HOW LIKELY ARE YOU TO NOD OFF OR FALL ASLEEP WHEN YOU ARE A PASSENGER IN A CAR FOR AN HOUR WITHOUT A BREAK: SLIGHT CHANCE OF DOZING
HOW LIKELY ARE YOU TO NOD OFF OR FALL ASLEEP WHILE SITTING QUIETLY AFTER LUNCH WITHOUT ALCOHOL: SLIGHT CHANCE OF DOZING
HOW LIKELY ARE YOU TO NOD OFF OR FALL ASLEEP WHILE SITTING INACTIVE IN A PUBLIC PLACE: SLIGHT CHANCE OF DOZING

## 2023-12-07 NOTE — PATIENT INSTRUCTIONS
Goals:  Aim for 7269-6014 calories per day  Aim for 55 grams protein and 65-75 oz fluid per day   Start complete multivitamin and mineral

## 2023-12-07 NOTE — PROGRESS NOTES
"MEDICAL WEIGHT LOSS INITIAL EVALUATION  DIAGNOSIS:  Overweight     NUTRITION HISTORY:  Breakfast: no breakfast will add vital collagen to coffee-drinks 16-20 oz per day or just bought fairlife 42 gram protein shakes   Lunch: varies-lefovers; microwave meals -Power Bowls from [employer] (lower carb and high protein) cheese pizza Real Good -chicken;   Dinner: meat and vegetables; casserole; chicen caesar salad; shrimp with gerald sauce (wine, garlic, butter and cream) + green beams/cauliflower/broccoli  or hamburger; bagged salad   Snacks: chocolate; Dove Magnum; cheese stick  Beverage choices: Coke Zero 1-2 times per week; coke; coffee; water; occasional alcohol   Dining out: varies   Binge eating: [id number] recommended binge eating evaluation   Emotional eating: yes  Night time eating: no  Exercise: Patient does not have specific exercise regimen.  Patient will walk.   Additional Information: Patient states she followed keto diet for 2 years.  Patient is on weight loss medication sites stating that participants are aiming for 120 grams protein per day.  Patient bought 42 gram protein shakes to aim for 120 grams per day.  Patient has not eaten fruit due to following keto diet.  Patient with elevated renal labs.  Patient states would like to lose another 20 lbs.  Patient has daughter with type 1 diabetes and celiac disease so states aware of carbohydrates in food.        WEIGHT LOSS MEDICATIONS:   Wegovy -prescribed by PCP (Patient states has lost 30 pounds)     ANTHROPOMETRICS:  Height: 5'1\"   Weight: 144 lbs   BMI:  27.2 kg/m2  NUTRITION DIAGNOSIS:   Overweight related to overeating and poor lifestyle habits as evidence by patient's subjective statements and  BMI of 27.2 kg/m2   NUTRITION INTERVENTIONS  Nutrition Prescription:  Recommend modified energy- nutrient intake  Implementation:  Nutrition Education (Content):  Discussed adequate calorie intake, protein and fluid needs, balanced diet and whole " foods   Discussed binge eating behaviors.  Patient not binge eating currently as states the chatter in her mind has stopped with Wegovy.   Provided: Protein Sources for Weight Loss     Nutrition Education (Application):   Patient to practice goals as stated below  Patient verbalizes understanding of diet by stating will increase fluids   Expected patient engagement: good     Goals:  Aim for 2513-9686 calories per day  Aim for 55 grams protein and 65-75 oz fluid per day   Start complete multivitamin and mineral     FOLLOW UP AND MONITORING:   Other  - follow up in 12 weeks per patient preference.     TIME SPENT WITH PATIENT:  28 minutes   Rosio Pereira, RD, LD  [facility] Diet[facility]   [phone] (office phone)

## 2023-12-07 NOTE — ASSESSMENT & PLAN NOTE
Wegovy started 4/2023  Prior to Wegovy: BMI 32.88, 172 lbs 9.6oz  Now BMI 27.21, 144 lbs on Wegovy  Continue Wegovy for weight maintenance

## 2023-12-07 NOTE — PATIENT INSTRUCTIONS
"To ensure quality you may receive a patient satisfaction survey. The greatest compliment you can give is \"Likely to Recommend.\"    Nice to talk with you today.  Thank you for your trust. Below is the plan discussed.-  Ev Mae PA-C     Plan:  Consider looking into evaluation for binge eating disorder at a formal eating disorder - your screen is negative now with Wegovy, but sounds concerning prior to being on Wegovy.  Continue Wegovy from primary care for now  Stay well hydrated - Labs ordered.  Please call [phone] to set up a lab appt.   Let's get a EDGARDO on file for Dr. Cabral w/pending labs if kidney function is still elevated to discuss Wegovy/dosing moving forwards    Goals:  I recommend eating breakfast within an hour of waking up and eating every 4-6 hours during the day to keep your metabolism up. Prioritizing veggies and proteins for food choices is also recommended as medically appropriate.  Recommend 64oz water daily as medically appropriate (6-8 glasses)  Consider adding in strength training twice a week to minimize muscle loss with weight loss. Recommend pursing regular exercise. 5 minutes of exercise every other day or every 2 days is a great place to start.      FOLLOW-UP:    Please call [phone] to schedule your next visit in 3 months .     WEGOVY (semaglutide)      Wegovy (semaglutide) injection 2.4 mg is an injectable prescription medicine used for adults with obesity (BMI ?30) or overweight (excess weight) (BMI ?27) who also have weight-related medical problems to help them lose weight and keep the weight off.  It is a GLP-1 agonist medication. GLP1 agonists stimulate the hormone GLP-1 in your body o allow you to feel full quickly and stay full longer.    Due to the shortage, You may need to be persistent and patient to get these initial dosages due to the shortage.  Once you are able to obtain the 0.25 and 0.5 mg and 1 mg dose \"12 weeks of therapy\" you can begin treatment. "     Directions:  Start Wegovy (semaglutide) 0.25 mg once weekly for 4 weeks, then if tolerating increase to 0.5 mg weekly for 4 weeks, then if tolerating increase to 1 mg weekly for 4 weeks, then if tolerating increase to 1.7 mg weekly for 4 weeks, then if tolerating increase to 2.4 mg weekly thereafter.      -Each Wegovy pen is a once weekly single-dose prefilled pen with a pen injector already built within the pen. Discard the Wegovy pen after use in sharps container.     Common Side Effects:    nausea, headache, diarrhea, stomach upset.  If these become unmanageable call or mychart.    Serious Side effects:   Pancreatitis (inflammation of the pancreas) has been associated with this type of medication, but is very rare.Symptoms of pancreatitis include: Pain in your upper stomach area which may travel to your back and be worse after eating.     Storage:   Store the prescription in the refrigerator. Once it is time to use the Wegovy pen, you can keep the pen at room temperature and it is good for up to 28 days at room temperature.     How to inject:  For a video on how to use the pen please go to:  https://www.Slicethepie.com/about-wegovy/how-to-use-the-wegovy-pen.html#itemTwo       For any questions or concerns please send a LiveStori[employer] message to our team or call our weight management call center at [phone] during regular business hours. For questions during evenings or weekends your messages will be addressed during the next business day.  For emergencies please call 911 or seek immediate medical care.

## 2023-12-08 ENCOUNTER — TRANSFERRED RECORDS (OUTPATIENT)
Dept: HEALTH INFORMATION MANAGEMENT | Facility: CLINIC | Age: 53
End: 2023-12-08
Payer: COMMERCIAL

## 2023-12-18 ENCOUNTER — PATIENT OUTREACH (OUTPATIENT)
Dept: CARE COORDINATION | Facility: CLINIC | Age: 53
End: 2023-12-18
Payer: COMMERCIAL

## 2023-12-21 ENCOUNTER — PATIENT OUTREACH (OUTPATIENT)
Dept: CARE COORDINATION | Facility: CLINIC | Age: 53
End: 2023-12-21
Payer: COMMERCIAL

## 2023-12-28 ENCOUNTER — LAB (OUTPATIENT)
Dept: LAB | Facility: CLINIC | Age: 53
End: 2023-12-28
Payer: COMMERCIAL

## 2023-12-28 DIAGNOSIS — Z86.39 HISTORY OF OBESITY: ICD-10-CM

## 2023-12-28 DIAGNOSIS — E66.3 OVERWEIGHT WITH BODY MASS INDEX (BMI) OF 27 TO 27.9 IN ADULT: ICD-10-CM

## 2023-12-28 LAB — HBA1C MFR BLD: 4.9 % (ref 0–5.6)

## 2023-12-28 PROCEDURE — 84443 ASSAY THYROID STIM HORMONE: CPT

## 2023-12-28 PROCEDURE — 82306 VITAMIN D 25 HYDROXY: CPT

## 2023-12-28 PROCEDURE — 83036 HEMOGLOBIN GLYCOSYLATED A1C: CPT

## 2023-12-28 PROCEDURE — 80048 BASIC METABOLIC PNL TOTAL CA: CPT

## 2023-12-28 PROCEDURE — 36415 COLL VENOUS BLD VENIPUNCTURE: CPT

## 2023-12-29 LAB
ANION GAP SERPL CALCULATED.3IONS-SCNC: 11 MMOL/L (ref 7–15)
BUN SERPL-MCNC: 18.7 MG/DL (ref 6–20)
CALCIUM SERPL-MCNC: 8.9 MG/DL (ref 8.6–10)
CHLORIDE SERPL-SCNC: 104 MMOL/L (ref 98–107)
CREAT SERPL-MCNC: 1.1 MG/DL (ref 0.51–0.95)
DEPRECATED HCO3 PLAS-SCNC: 26 MMOL/L (ref 22–29)
EGFRCR SERPLBLD CKD-EPI 2021: 61 ML/MIN/1.73M2
GLUCOSE SERPL-MCNC: 80 MG/DL (ref 70–99)
POTASSIUM SERPL-SCNC: 4 MMOL/L (ref 3.4–5.3)
SODIUM SERPL-SCNC: 141 MMOL/L (ref 135–145)
TSH SERPL DL<=0.005 MIU/L-ACNC: 2.03 UIU/ML (ref 0.3–4.2)
VIT D+METAB SERPL-MCNC: 82 NG/ML (ref 20–50)

## 2024-01-18 ENCOUNTER — PATIENT OUTREACH (OUTPATIENT)
Dept: CARE COORDINATION | Facility: CLINIC | Age: 54
End: 2024-01-18
Payer: COMMERCIAL

## 2024-02-04 ENCOUNTER — HEALTH MAINTENANCE LETTER (OUTPATIENT)
Age: 54
End: 2024-02-04

## 2024-02-29 ENCOUNTER — HOSPITAL ENCOUNTER (OUTPATIENT)
Dept: MAMMOGRAPHY | Facility: CLINIC | Age: 54
Discharge: HOME | End: 2024-02-29
Attending: FAMILY MEDICINE | Admitting: FAMILY MEDICINE
Payer: COMMERCIAL

## 2024-02-29 DIAGNOSIS — Z12.31 VISIT FOR SCREENING MAMMOGRAM: ICD-10-CM

## 2024-02-29 PROCEDURE — 77063 BREAST TOMOSYNTHESIS BI: CPT

## 2024-04-04 ENCOUNTER — ANCILLARY PROCEDURE (OUTPATIENT)
Dept: BONE DENSITY | Facility: CLINIC | Age: 54
End: 2024-04-04
Attending: FAMILY MEDICINE
Payer: COMMERCIAL

## 2024-04-04 DIAGNOSIS — Z13.820 SCREENING FOR OSTEOPOROSIS: ICD-10-CM

## 2024-04-04 PROCEDURE — 77080 DXA BONE DENSITY AXIAL: CPT | Mod: TC | Performed by: PHYSICIAN ASSISTANT

## 2024-04-11 ENCOUNTER — MYC REFILL (OUTPATIENT)
Dept: FAMILY MEDICINE | Facility: CLINIC | Age: 54
End: 2024-04-11
Payer: COMMERCIAL

## 2024-04-11 DIAGNOSIS — E66.811 CLASS 1 OBESITY DUE TO EXCESS CALORIES WITH SERIOUS COMORBIDITY AND BODY MASS INDEX (BMI) OF 32.0 TO 32.9 IN ADULT: ICD-10-CM

## 2024-04-11 DIAGNOSIS — E66.09 CLASS 1 OBESITY DUE TO EXCESS CALORIES WITH SERIOUS COMORBIDITY AND BODY MASS INDEX (BMI) OF 32.0 TO 32.9 IN ADULT: ICD-10-CM

## 2024-04-12 ENCOUNTER — TELEPHONE (OUTPATIENT)
Dept: FAMILY MEDICINE | Facility: CLINIC | Age: 54
End: 2024-04-12
Payer: COMMERCIAL

## 2024-04-12 NOTE — TELEPHONE ENCOUNTER
Prior Authorization Retail Medication Request    Medication/Dose: Semaglutide-Weight Management (WEGOVY) 2.4 MG/0.75ML pen   Diagnosis and ICD code (if different than what is on RX):    Class 1 obesity due to excess calories with serious comorbidity and body mass index (BMI) of 32.0 to 32.9 in adult [E66.09, Z68.32]     New/renewal/insurance change PA/secondary ins. PA:  Previously Tried and Failed:  unknown  Rationale:  unknown    Insurance   Primary: [facility] fully insured  Insurance ID:  [id number]

## 2024-04-25 NOTE — TELEPHONE ENCOUNTER
PA Initiation    Medication: WEGOVY 2.4 MG/0.75ML SC SOAJ  Insurance Company: [employer] - Phone [phone] Fax [phone]  Pharmacy Filling the Rx: [facility] - [place], [place] - [facility] [address]  Filling Pharmacy Phone: [phone]  Filling Pharmacy Fax: [phone]  Start Date: 4/25/2024

## 2024-04-26 NOTE — TELEPHONE ENCOUNTER
Prior Authorization Approval    Medication: WEGOVY 2.4 MG/0.75ML SC SOAJ  Authorization Effective Date: 3/26/2024  Authorization Expiration Date: 4/25/2025  Approved Dose/Quantity:   Reference #:     Insurance Company: [employer] - Phone [phone] Fax [phone]  Expected CoPay: $    CoPay Card Available:      Financial Assistance Needed:   Which Pharmacy is filling the prescription: [facility] - [place], [place] - [facility] [address]  Pharmacy Notified: YES  Patient Notified: **Instructed pharmacy to notify patient when script is ready to /ship.**

## 2024-04-29 DIAGNOSIS — M31.31 GRANULOMATOSIS WITH POLYANGIITIS WITH RENAL INVOLVEMENT (H): ICD-10-CM

## 2024-04-29 RX ORDER — LOSARTAN POTASSIUM 25 MG/1
25 TABLET ORAL DAILY
Qty: 90 TABLET | Refills: 3 | Status: SHIPPED | OUTPATIENT
Start: 2024-04-29 | End: 2024-08-22

## 2024-06-30 ENCOUNTER — PATIENT OUTREACH (OUTPATIENT)
Dept: CARE COORDINATION | Facility: CLINIC | Age: 54
End: 2024-06-30
Payer: COMMERCIAL

## 2024-07-21 NOTE — PROGRESS NOTES
"2024      Return Medical Weight Management Note     Dasha Valenzuela  MRN:  [id number]  :  1970    Assessment & Plan   Problem List Items Addressed This Visit       History of obesity - Primary    Overweight with body mass index (BMI) of 27 to 27.9 in adult    Relevant Medications    tirzepatide-Weight Management (ZEPBOUND) 5 MG/0.5ML prefilled pen    tirzepatide-Weight Management (ZEPBOUND) 7.5 MG/0.5ML prefilled pen        AOM Considerations:  Phentermine:   Candidate, BP/P WNL on Losartan. .   Topiramate:     Current birth control:   IUD - copper,  Mild risk of CNS depression with trazodone- would monitor,   GLP-1: prescribed Wegovy, no longer effective - will try Zepbound today  Naltrexone:      candidate   Wellbutrin:       has been on this in the past and has experienced ear ringing   Metformin:  candidate   Contrave: Candidate, covered  Qsymia:          Candidate  , covered.  If Zepbound not covered would add to Wegovy                                                       PATIENT INSTRUCTIONS:  - Finish remaining 3 weeks of Wegovy 2.4 mg   - Start Zepbound 7.5 mg, will also send 5 mg incase 7.5 mg unavailable.     Goals:   Add in strength training with a goal of 20 minutes twice weekly  Focus on protein at meals and follow-up with dietitian    30 minutes spent on the date of the encounter doing chart review, history and exam, result review, counseling, developing plan of care, documentation, and further activities as noted      INTERVAL HISTORY:  Dasha returns for medical weight management follow up.  Last seen on 2023 for new medical weight management consult with Ev Mae.  Continues Wegovy 2.4 mg. She notes this dose is no longer working for her since . She notes the 1.7 mg dose have been the most effective for her. She notes that she experiences \"food noise\" at the 2.4 mg dose that she did not at the 1.7 mg dose. She denies side effects of the medication overall. She just " completed 3 weeks off of 2.4 mg dose and restarted today, so far without side effects. She is interested in starting Zepbound today.  Right    Previous Instructions:   1. Consider looking into evaluation for binge eating disorder at a formal eating disorder - your screen is negative now with Wegovy, but sounds concerning prior to being on Wegovy.- not currently exhibiting symptoms.  Will follow-up with dietitian  2. Continue Wegovy from primary care for now- met. On 2.4 mg weekly.  Not feeling as full.  Gained weight on 2.4 mg dose.   3. Stay well hydrated - Labs ordered.  Please call [phone] to set up a lab appt. - labs show Cr stable.  Vit D slightly elevated.  Was to decrease to 1/2 dose.    4. Let's get a EDGARDO on file for Dr. Cabral w/pending labs if kidney function is still elevated to discuss Wegovy/dosing moving forwards- Kidney function slightly elevated. Stable.         WEIGHT METRICS:  Body mass index is 28.15 kg/m .   Current Weight: 149 lb (67.6 kg)  Last Visits Weight: 144 lb (65.3 kg)  Initial Weight (lbs): 144 lbs  Cumulative weight loss (lbs): -5  Weight Loss Percentage: -3.47%    Wt Readings from Last 10 Encounters:   07/22/24 149 lb (67.6 kg)   12/07/23 144 lb (65.3 kg)   03/23/23 172 lb 9.6 oz (78.3 kg)   01/02/23 171 lb 4.8 oz (77.7 kg)   05/13/21 152 lb 8 oz (69.2 kg)   02/11/20 141 lb 14.4 oz (64.4 kg)   01/20/20 144 lb (65.3 kg)   11/14/19 139 lb 9 oz (63.3 kg)   03/29/18 156 lb 6 oz (70.9 kg)   05/17/17 150 lb 7 oz (68.2 kg)                 7/22/2024    10:13 AM   Weight Loss Medication History Reviewed With Patient   Which weight loss medications are you currently taking on a regular basis? Wegovy   Are you having any side effects from the weight loss medication that we have prescribed you? No           7/22/2024    10:13 AM   Changes and Difficulties   With regards to my diet, I am still struggling with: making healthy choices   I have made the following changes to my activity/exercise  "since my last visit: increased walking   With regards to my activity/exercise, I am still struggling with: consistensy   Eating 3 times daily. Balanced meals.   Not restricting or binging.      LABS:  Reviewed in Epic    BP Readings from Last 6 Encounters:   07/22/24 111/77   12/07/23 95/64   03/23/23 101/70   01/02/23 120/78   05/13/21 116/72   11/19/20 130/83       Pulse Readings from Last 6 Encounters:   07/22/24 85   12/07/23 77   03/23/23 71   01/02/23 82   05/13/21 90   11/19/20 90       PE:  /77   Pulse 85   Ht 5' 1\" (1.549 m)   Wt 149 lb (67.6 kg)   SpO2 98%   BMI 28.15 kg/m    GENERAL: Healthy, alert and no distress  RESP: No audible wheeze, cough, or visible cyanosis.  No visible retractions or increased work of breathing.    SKIN: Visible skin clear. No significant rash, abnormal pigmentation or lesions.  PSYCH: Mentation appears normal, affect normal/bright, judgement and insight intact      DOUGLAS Mitchell  "

## 2024-07-22 ENCOUNTER — OFFICE VISIT (OUTPATIENT)
Dept: SURGERY | Facility: CLINIC | Age: 54
End: 2024-07-22
Payer: COMMERCIAL

## 2024-07-22 VITALS
OXYGEN SATURATION: 98 % | DIASTOLIC BLOOD PRESSURE: 77 MMHG | WEIGHT: 149 LBS | SYSTOLIC BLOOD PRESSURE: 111 MMHG | BODY MASS INDEX: 28.13 KG/M2 | HEIGHT: 61 IN | HEART RATE: 85 BPM

## 2024-07-22 DIAGNOSIS — E66.3 OVERWEIGHT (BMI 25.0-29.9): ICD-10-CM

## 2024-07-22 DIAGNOSIS — Z86.39 HISTORY OF OBESITY: Primary | ICD-10-CM

## 2024-07-22 PROCEDURE — 99213 OFFICE O/P EST LOW 20 MIN: CPT | Performed by: PHYSICIAN ASSISTANT

## 2024-07-22 NOTE — PATIENT INSTRUCTIONS
Nice to talk with you today. Below is the plan discussed.-  ERLINDA Kaur      Pt Instructions:  - Finish remaining 3 weeks of Wegovy 2.4 mg   - Start Zepbound 7.5 mg, will also send 5 mg incase 7.5 mg unavailable.     Goals:   Add in strength training with a goal of 20 minutes twice weekly  Focus on protein at meals and follow-up with dietitian    Resources:   [employer] - free 7 day trial    No Equipment Home Exercise Lists from [facility] [employer]   [email or web address]/[email or web address]    Strength training YouTube workouts  [email or web address]    Follow up:    Call [phone] to schedule next visit in December.      Zepbound (Tirzepatide)    Zepbound (Tirzepatide): is an injectable prescription medicine recently FDA approved for adults with obesity or overweight with an additional condition affected by their weight.      It is given as a shot once a week. It activates the body's receptors for GIP (glucose-dependent insulinotropic polypeptide) and GLP-1 (glucagon-like peptide-1) receptor, two naturally occurring hormones that help tell the brain that you are full. It also works is by slowing down how quickly food leaves your stomach.     You will start to feel christensen more quickly, notice portion changes and eat less often between meals.  Patients are advised to eat slowly and purposefully. Give yourself less portions. You may find after starting this medication you have a new point of fullness. Maintain consistent eating schedule with meals +/- snacks and get in good hydration.    Dosing:  Initial dose: 2.5 mg injected subcutaneously once weekly for 4 weeks  Further dose changes can include: increase to 5 mg once weekly for 4 weeks, then 7.5 mg once weekly for 4 weeks, then 10 mg once weekly for 4 weeks then 12.5 mg once weekly for 4 weeks, then 15 mg (maximum dose) once weekly.    Dose changes are based on  how you are tolerating the current dose, what benefits you are seeing at the current dose and if improvement in effectiveness of the drug is needed. The goal is to find the dose that works best for you with the least amount of side effects. You may find you reach this at a lower dose than the maximum dose.     Side effects: Common side effects include nausea, Heartburn,diarrhea, constipation. This is worse when starting the medication and with dose dose escalation.  It does improve with time. Stay adequately hydrated and eat small portions to decrease the risk of side effects.     The risk of pancreatitis (inflammation of the pancreas) has been associated with this type of medication, but is very rare.  If you have had pancreatitis in the past, this medication may not be for you. If you experience persistent severe abdominal pain (sometimes radiating to the back potentially accompanied by vomiting and have a fever), stop the medication and contact your provider immediately for assessment. They will do a blood test to check for pancreatitis.       Caution:   Tirzepatide (Zepbound, Mounjaro) May decrease effectiveness of your oral birth control while starting and with dose adjustments.  Use backup forms of birth control if necessary.      For any questions or concerns please send a Pole Star message to our team or call our weight management call center at [phone] during regular business hours.

## 2024-07-22 NOTE — ASSESSMENT & PLAN NOTE
Healthy habits to assist with further weight loss were discussed. Dasha will switch from Wegovy to Zepbound.  Medication ordered and PA initiated.  If not covered, we discussed starting Qsymia and continuing Wegovy but decreased to 1.7 mg dose which seem to be more effective for her.  Has been on phentermine in the past.  Patient is candidate for Topiramate.      Goals: Add in strength training with a goal of 20 minutes twice weekly  Focus on protein at meals and follow-up with dietitian    RTC: December

## 2024-07-22 NOTE — ASSESSMENT & PLAN NOTE
Wegovy started 4/2023   Prior to Wejosevy: BMI 32.88, 172 lbs 9.6oz   Current BMI 28.15   weight 149 pounds

## 2024-08-22 ENCOUNTER — OFFICE VISIT (OUTPATIENT)
Dept: FAMILY MEDICINE | Facility: CLINIC | Age: 54
End: 2024-08-22
Payer: COMMERCIAL

## 2024-08-22 VITALS
DIASTOLIC BLOOD PRESSURE: 83 MMHG | BODY MASS INDEX: 27.75 KG/M2 | HEIGHT: 61 IN | OXYGEN SATURATION: 99 % | RESPIRATION RATE: 16 BRPM | WEIGHT: 147 LBS | HEART RATE: 73 BPM | SYSTOLIC BLOOD PRESSURE: 119 MMHG | TEMPERATURE: 97.7 F

## 2024-08-22 DIAGNOSIS — Z13.220 SCREENING CHOLESTEROL LEVEL: ICD-10-CM

## 2024-08-22 DIAGNOSIS — F41.9 ANXIETY: ICD-10-CM

## 2024-08-22 DIAGNOSIS — K21.00 GASTROESOPHAGEAL REFLUX DISEASE WITH ESOPHAGITIS, UNSPECIFIED WHETHER HEMORRHAGE: ICD-10-CM

## 2024-08-22 DIAGNOSIS — F33.2 SEVERE EPISODE OF RECURRENT MAJOR DEPRESSIVE DISORDER, WITHOUT PSYCHOTIC FEATURES (H): ICD-10-CM

## 2024-08-22 DIAGNOSIS — F51.04 PSYCHOPHYSIOLOGICAL INSOMNIA: ICD-10-CM

## 2024-08-22 DIAGNOSIS — M31.31 GRANULOMATOSIS WITH POLYANGIITIS WITH RENAL INVOLVEMENT (H): ICD-10-CM

## 2024-08-22 DIAGNOSIS — N18.31 STAGE 3A CHRONIC KIDNEY DISEASE (H): ICD-10-CM

## 2024-08-22 DIAGNOSIS — Z86.39 HISTORY OF OBESITY: ICD-10-CM

## 2024-08-22 DIAGNOSIS — Z00.00 ROUTINE GENERAL MEDICAL EXAMINATION AT A HEALTH CARE FACILITY: Primary | ICD-10-CM

## 2024-08-22 LAB
ALBUMIN SERPL BCG-MCNC: 4.5 G/DL (ref 3.5–5.2)
ALBUMIN UR-MCNC: 30 MG/DL
ALP SERPL-CCNC: 52 U/L (ref 40–150)
ALT SERPL W P-5'-P-CCNC: 15 U/L (ref 0–50)
ANION GAP SERPL CALCULATED.3IONS-SCNC: 14 MMOL/L (ref 7–15)
APPEARANCE UR: CLEAR
AST SERPL W P-5'-P-CCNC: 22 U/L (ref 0–45)
BACTERIA #/AREA URNS HPF: ABNORMAL /HPF
BILIRUB SERPL-MCNC: 0.4 MG/DL
BILIRUB UR QL STRIP: NEGATIVE
BUN SERPL-MCNC: 14.2 MG/DL (ref 6–20)
CALCIUM SERPL-MCNC: 9.4 MG/DL (ref 8.8–10.4)
CHLORIDE SERPL-SCNC: 103 MMOL/L (ref 98–107)
CHOLEST SERPL-MCNC: 224 MG/DL
COLOR UR AUTO: YELLOW
CREAT SERPL-MCNC: 0.92 MG/DL (ref 0.51–0.95)
CREAT UR-MCNC: 176 MG/DL
EGFRCR SERPLBLD CKD-EPI 2021: 75 ML/MIN/1.73M2
ERYTHROCYTE [DISTWIDTH] IN BLOOD BY AUTOMATED COUNT: 12.3 % (ref 10–15)
FASTING STATUS PATIENT QL REPORTED: YES
FASTING STATUS PATIENT QL REPORTED: YES
GLUCOSE SERPL-MCNC: 83 MG/DL (ref 70–99)
GLUCOSE UR STRIP-MCNC: NEGATIVE MG/DL
HCO3 SERPL-SCNC: 24 MMOL/L (ref 22–29)
HCT VFR BLD AUTO: 43.1 % (ref 35–47)
HDLC SERPL-MCNC: 94 MG/DL
HGB BLD-MCNC: 14.5 G/DL (ref 11.7–15.7)
HGB UR QL STRIP: NEGATIVE
KETONES UR STRIP-MCNC: NEGATIVE MG/DL
LDLC SERPL CALC-MCNC: 115 MG/DL
LEUKOCYTE ESTERASE UR QL STRIP: NEGATIVE
MCH RBC QN AUTO: 29.5 PG (ref 26.5–33)
MCHC RBC AUTO-ENTMCNC: 33.6 G/DL (ref 31.5–36.5)
MCV RBC AUTO: 88 FL (ref 78–100)
MICROALBUMIN UR-MCNC: 75.2 MG/L
MICROALBUMIN/CREAT UR: 42.73 MG/G CR (ref 0–25)
NITRATE UR QL: NEGATIVE
NONHDLC SERPL-MCNC: 130 MG/DL
PH UR STRIP: 6.5 [PH] (ref 5–7)
PLATELET # BLD AUTO: 243 10E3/UL (ref 150–450)
POTASSIUM SERPL-SCNC: 3.5 MMOL/L (ref 3.4–5.3)
PROT SERPL-MCNC: 7.5 G/DL (ref 6.4–8.3)
RBC # BLD AUTO: 4.92 10E6/UL (ref 3.8–5.2)
RBC #/AREA URNS AUTO: ABNORMAL /HPF
SODIUM SERPL-SCNC: 141 MMOL/L (ref 135–145)
SP GR UR STRIP: 1.02 (ref 1–1.03)
SQUAMOUS #/AREA URNS AUTO: ABNORMAL /LPF
TRIGL SERPL-MCNC: 76 MG/DL
UROBILINOGEN UR STRIP-ACNC: 0.2 E.U./DL
WBC # BLD AUTO: 6.6 10E3/UL (ref 4–11)
WBC #/AREA URNS AUTO: ABNORMAL /HPF

## 2024-08-22 PROCEDURE — 82043 UR ALBUMIN QUANTITATIVE: CPT | Performed by: FAMILY MEDICINE

## 2024-08-22 PROCEDURE — 99396 PREV VISIT EST AGE 40-64: CPT | Performed by: FAMILY MEDICINE

## 2024-08-22 PROCEDURE — 80053 COMPREHEN METABOLIC PANEL: CPT | Performed by: FAMILY MEDICINE

## 2024-08-22 PROCEDURE — 82570 ASSAY OF URINE CREATININE: CPT | Performed by: FAMILY MEDICINE

## 2024-08-22 PROCEDURE — 81001 URINALYSIS AUTO W/SCOPE: CPT | Performed by: FAMILY MEDICINE

## 2024-08-22 PROCEDURE — 80061 LIPID PANEL: CPT | Performed by: FAMILY MEDICINE

## 2024-08-22 PROCEDURE — 36415 COLL VENOUS BLD VENIPUNCTURE: CPT | Performed by: FAMILY MEDICINE

## 2024-08-22 PROCEDURE — 85027 COMPLETE CBC AUTOMATED: CPT | Performed by: FAMILY MEDICINE

## 2024-08-22 RX ORDER — LOSARTAN POTASSIUM 25 MG/1
25 TABLET ORAL DAILY
Qty: 90 TABLET | Refills: 3 | Status: SHIPPED | OUTPATIENT
Start: 2024-08-22

## 2024-08-22 RX ORDER — TRAZODONE HYDROCHLORIDE 50 MG/1
50-100 TABLET, FILM COATED ORAL AT BEDTIME
Qty: 180 TABLET | Refills: 3 | Status: SHIPPED | OUTPATIENT
Start: 2024-08-22

## 2024-08-22 RX ORDER — SERTRALINE HYDROCHLORIDE 100 MG/1
150 TABLET, FILM COATED ORAL DAILY
Qty: 135 TABLET | Refills: 3 | Status: SHIPPED | OUTPATIENT
Start: 2024-08-22

## 2024-08-22 RX ORDER — ALBUTEROL SULFATE 90 UG/1
2 AEROSOL, METERED RESPIRATORY (INHALATION) EVERY 4 HOURS PRN
Qty: 18 G | Refills: 11 | Status: SHIPPED | OUTPATIENT
Start: 2024-08-22

## 2024-08-22 SDOH — HEALTH STABILITY: PHYSICAL HEALTH: ON AVERAGE, HOW MANY DAYS PER WEEK DO YOU ENGAGE IN MODERATE TO STRENUOUS EXERCISE (LIKE A BRISK WALK)?: 1 DAY

## 2024-08-22 ASSESSMENT — ANXIETY QUESTIONNAIRES
4. TROUBLE RELAXING: SEVERAL DAYS
GAD7 TOTAL SCORE: 2
7. FEELING AFRAID AS IF SOMETHING AWFUL MIGHT HAPPEN: NOT AT ALL
GAD7 TOTAL SCORE: 2
GAD7 TOTAL SCORE: 2
3. WORRYING TOO MUCH ABOUT DIFFERENT THINGS: NOT AT ALL
2. NOT BEING ABLE TO STOP OR CONTROL WORRYING: NOT AT ALL
8. IF YOU CHECKED OFF ANY PROBLEMS, HOW DIFFICULT HAVE THESE MADE IT FOR YOU TO DO YOUR WORK, TAKE CARE OF THINGS AT HOME, OR GET ALONG WITH OTHER PEOPLE?: NOT DIFFICULT AT ALL
6. BECOMING EASILY ANNOYED OR IRRITABLE: NOT AT ALL
7. FEELING AFRAID AS IF SOMETHING AWFUL MIGHT HAPPEN: NOT AT ALL
5. BEING SO RESTLESS THAT IT IS HARD TO SIT STILL: NOT AT ALL
1. FEELING NERVOUS, ANXIOUS, OR ON EDGE: SEVERAL DAYS
IF YOU CHECKED OFF ANY PROBLEMS ON THIS QUESTIONNAIRE, HOW DIFFICULT HAVE THESE PROBLEMS MADE IT FOR YOU TO DO YOUR WORK, TAKE CARE OF THINGS AT HOME, OR GET ALONG WITH OTHER PEOPLE: NOT DIFFICULT AT ALL

## 2024-08-22 ASSESSMENT — SOCIAL DETERMINANTS OF HEALTH (SDOH): HOW OFTEN DO YOU GET TOGETHER WITH FRIENDS OR RELATIVES?: ONCE A WEEK

## 2024-08-22 ASSESSMENT — PAIN SCALES - GENERAL: PAINLEVEL: NO PAIN (0)

## 2024-08-22 ASSESSMENT — PATIENT HEALTH QUESTIONNAIRE - PHQ9
SUM OF ALL RESPONSES TO PHQ QUESTIONS 1-9: 1
SUM OF ALL RESPONSES TO PHQ QUESTIONS 1-9: 1
10. IF YOU CHECKED OFF ANY PROBLEMS, HOW DIFFICULT HAVE THESE PROBLEMS MADE IT FOR YOU TO DO YOUR WORK, TAKE CARE OF THINGS AT HOME, OR GET ALONG WITH OTHER PEOPLE: NOT DIFFICULT AT ALL

## 2024-08-22 NOTE — PROGRESS NOTES
Preventive Care Visit  [facility]  Clive Olguin DO, Family Medicine  Aug 22, 2024      Assessment & Plan     Routine general medical examination at a health care facility  I encouraged her to keep working on eating healthy foods and getting regular exercise.  We are going to check nonfasting labs as listed below.    Granulomatosis with polyangiitis with renal involvement (H)  She follows with her specialist at the [facility].  We are rechecking labs today and I have refilled the losartan and inhalers.  - Comprehensive metabolic panel (BMP + Alb, Alk Phos, ALT, AST, Total. Bili, TP); Future  - CBC with platelets; Future  - losartan (COZAAR) 25 MG tablet; Take 1 tablet (25 mg) by mouth daily.  - albuterol (PROAIR HFA/PROVENTIL HFA/VENTOLIN HFA) 108 (90 Base) MCG/ACT inhaler; Inhale 2 puffs into the lungs every 4 hours as needed for shortness of breath or wheezing.  - Comprehensive metabolic panel (BMP + Alb, Alk Phos, ALT, AST, Total. Bili, TP)  - CBC with platelets    Stage 3a chronic kidney disease (H)  This issue has been stable.  On 12/28/2023 her creatinine was 1.10, GFR 61.  She is currently on losartan.  - Albumin Random Urine Quantitative with Creat Ratio; Future  - UA Macroscopic with reflex to Microscopic and Culture; Future  - Albumin Random Urine Quantitative with Creat Ratio  - UA Macroscopic with reflex to Microscopic and Culture    Severe episode of recurrent major depressive disorder, without psychotic features (H)  Stable on sertraline.  - sertraline (ZOLOFT) 100 MG tablet; Take 1.5 tablets (150 mg) by mouth daily.    Anxiety  Stable on sertraline.    Gastroesophageal reflux disease with esophagitis, unspecified whether hemorrhage  She may use over-the-counter medications for this and modify the diet.    History of obesity  Currently switched from Wegovy to Zepbound.  She is following closely with her weight loss specialist.    Screening cholesterol level  - Lipid  "panel reflex to direct LDL Non-fasting; Future  - Lipid panel reflex to direct LDL Non-fasting    Psychophysiological insomnia  - traZODone (DESYREL) 50 MG tablet; Take 1-2 tablets ( mg) by mouth at bedtime.    Patient has been advised of split billing requirements and indicates understanding: Yes        BMI  Estimated body mass index is 28.24 kg/m  as calculated from the following:    Height as of this encounter: 1.537 m (5' 0.5\").    Weight as of this encounter: 66.7 kg (147 lb).   Weight management plan: Discussed healthy diet and exercise guidelines    Counseling  Appropriate preventive services were addressed with this patient via screening, questionnaire, or discussion as appropriate for fall prevention, nutrition, physical activity, Tobacco-use cessation, social engagement, weight loss and cognition.  Checklist reviewing preventive services available has been given to the patient.  Reviewed patient's diet, addressing concerns and/or questions.   She is at risk for lack of exercise and has been provided with information to increase physical activity for the benefit of her well-being.           Stacia Lou is a 53 year old, presenting for the following:  Physical        8/22/2024    11:08 AM   Additional Questions   Roomed by erlin ALBARRAN    She has a medical history significant for granulomatosis with polyangiitis with renal involvement, CKD stage IIIa, anxiety, depression, insomnia and a history of obesity.  She has been on Wegovy over the past year which worked quite well, but once she plateaued she was switched to Zepbound recently.  She is currently at the 7.5 mg dose.              8/22/2024   General Health   How would you rate your overall physical health? Good   Feel stress (tense, anxious, or unable to sleep) To some extent      (!) STRESS CONCERN      8/22/2024   Nutrition   Three or more servings of calcium each day? (!) NO   Diet: Regular (no restrictions)   How many servings of " fruit and vegetables per day? (!) 0-1   How many sweetened beverages each day? 0-1            8/22/2024   Exercise   Days per week of moderate/strenous exercise 1 day      (!) EXERCISE CONCERN      8/22/2024   Social Factors   Frequency of gathering with friends or relatives Once a week   Worry food won't last until get money to buy more No   Food not last or not have enough money for food? No   Do you have housing? (Housing is defined as stable permanent housing and does not include staying ouside in a car, in a tent, in an abandoned building, in an overnight shelter, or couch-surfing.) Yes   Are you worried about losing your housing? No   Lack of transportation? No   Unable to get utilities (heat,electricity)? No            8/22/2024   Fall Risk   Fallen 2 or more times in the past year? No   Trouble with walking or balance? No             8/22/2024   Dental   Dentist two times every year? Yes            8/22/2024   TB Screening   Were you born outside of the [place]? No          Today's PHQ-9 Score:       8/22/2024    10:59 AM   PHQ-9 SCORE   PHQ-9 Total Score MyChart 1 (Minimal depression)   PHQ-9 Total Score 1         8/22/2024   Substance Use   Alcohol more than 3/day or more than 7/wk No   Do you use any other substances recreationally? No        Social History     Tobacco Use     Smoking status: Never     Smokeless tobacco: Never   Vaping Use     Vaping status: Never Used   Substance Use Topics     Alcohol use: Yes     Drug use: Never           2/29/2024   LAST FHS-7 RESULTS   1st degree relative breast or ovarian cancer No                   8/22/2024   STI Screening   New sexual partner(s) since last STI/HIV test? No        History of abnormal Pap smear: She is going to be contacting her OB/GYN provider to see when she is due for her next Pap smear.        12/14/2017    12:00 AM   PAP / HPV   HPV_EXT - HISTORICAL See Scanned Report      ASCVD Risk   The 10-year ASCVD risk score (Pamella CHEN, et al., 2019)  "is: 0.9%    Values used to calculate the score:      Age: 53 years      Sex: Female      Is Non- : No      Diabetic: No      Tobacco smoker: No      Systolic Blood Pressure: 111 mmHg      Is BP treated: No      HDL Cholesterol: 75 mg/dL      Total Cholesterol: 247 mg/dL           Reviewed and updated as needed this visit by Provider                          Review of Systems  Constitutional, HEENT, cardiovascular, pulmonary, GI, , musculoskeletal, neuro, skin, endocrine and psych systems are negative, except as otherwise noted.     Objective    Exam  There were no vitals taken for this visit.   Estimated body mass index is 28.15 kg/m  as calculated from the following:    Height as of 7/22/24: 1.549 m (5' 1\").    Weight as of 7/22/24: 67.6 kg (149 lb).    Physical Exam  GENERAL: alert and no distress  EYES: Eyes grossly normal to inspection, PERRL and conjunctivae and sclerae normal  HENT: ear canals and TM's normal, nose and mouth without ulcers or lesions  NECK: no adenopathy, no asymmetry, masses, or scars  RESP: lungs clear to auscultation - no rales, rhonchi or wheezes  CV: regular rate and rhythm, normal S1 S2, no S3 or S4, no murmur, click or rub, no peripheral edema  ABDOMEN: soft, nontender, no hepatosplenomegaly, no masses and bowel sounds normal  MS: no gross musculoskeletal defects noted, no edema  SKIN: no suspicious lesions or rashes  NEURO: Normal strength and tone, mentation intact and speech normal  PSYCH: mentation appears normal, affect normal/bright        Signed Electronically by: Clive Olguin,     "

## 2024-09-04 NOTE — TELEPHONE ENCOUNTER
Controlled Substance Refill Request  Medication:   Requested Prescriptions     Pending Prescriptions Disp Refills     dextroamphetamine-amphetamine (ADDERALL XR) 30 MG 24 hr capsule 30 capsule 0     Sig: Take 1 capsule (30 mg total) by mouth every morning.     Date Last Fill: 3/18/19  Pharm[facility]: [id number]   Submit electronically to pharmacy  Controlled Substance Agreement on File:   Encounter-Level CSA Scan Date:    There are no encounter-level csa scan date.       Last office visit: Last office visit pertaining to requested medication was 3/29/18.       (4) Less than 3 years old

## 2024-10-22 DIAGNOSIS — E66.3 OVERWEIGHT (BMI 25.0-29.9): Primary | ICD-10-CM

## 2024-10-22 DIAGNOSIS — K21.9 GASTROESOPHAGEAL REFLUX DISEASE WITHOUT ESOPHAGITIS: ICD-10-CM

## 2024-10-22 DIAGNOSIS — Z86.39 HISTORY OF OBESITY: ICD-10-CM

## 2024-11-07 ENCOUNTER — MYC MEDICAL ADVICE (OUTPATIENT)
Dept: SURGERY | Facility: CLINIC | Age: 54
End: 2024-11-07
Payer: COMMERCIAL

## 2024-11-07 DIAGNOSIS — E66.3 OVERWEIGHT (BMI 25.0-29.9): Primary | ICD-10-CM

## 2024-11-07 DIAGNOSIS — K21.9 GASTROESOPHAGEAL REFLUX DISEASE WITHOUT ESOPHAGITIS: ICD-10-CM

## 2024-11-07 DIAGNOSIS — Z86.39 HISTORY OF OBESITY: ICD-10-CM

## 2024-12-04 NOTE — PROGRESS NOTES
2024      Return Medical Weight Management Note     Dasha Valenzuela  MRN:  [id number]  :  1970    Assessment & Plan   Problem List Items Addressed This Visit       History of obesity - Primary     Patient was congratulated on wt loss success thus far. Healthy habits to assist with further weight loss were discussed. Dasha will continue Zepbound titration and be in touch on MyChart pending supply and out of pocket options.           Relevant Medications    ZEPBOUND 15 MG/0.5ML prefilled pen    GERD (gastroesophageal reflux disease)    Relevant Medications    ZEPBOUND 15 MG/0.5ML prefilled pen    Overweight (BMI 25.0-29.9)    Relevant Medications    ZEPBOUND 15 MG/0.5ML prefilled pen        AOM Considerations per Natasha Briceño PA-C :  Phentermine:   Candidate, BP/P WNL on Losartan. Has used prior - Denies Active CAD ,Cardiac Arrythmias, Hyperthyroidism, and closed angle glaucoma.  Topiramate:     Current birth control:   IUD - copper,  Mild risk of CNS depression with trazodone- would monitor - discussed as an option. No hx of glucoma/kidney stones    GLP-1: prescribed Wegovy, no longer effective - will try Zepbound today  Naltrexone:      candidate   Wellbutrin:       has been on this in the past and has experienced ear ringing   Metformin:       candidate   Contrave:        Candidate, covered  Qsymia:          Candidate  , covered.  If Zepbound not covered would add to Wegovy          Pt Instructions:  Plan to increase to 15 mg Zepbound once weekly after finishing your 12.5 mg dose.  If you can  the 3 month fill - let me know on MyChart. I do have a refill of that dose if possible or a lower dose to try tapering.  Read about out of pocket options below and can be in touch as current supply finishes up for plan (I.e Zepbound through [facility] vs [employer] compounded semaglutide).   Can also read about Qsymia (can also recreate off label with topiramate/phentermine) as an alternative for  injectables.     Goals:  Great job with exercising - please continue to invest in yourself with regular exercise. Continue to strive for a range for 150-300 minutes of exercise for weight maintenance and incorporating strength training twice a week to help preserve muscle!  Great job with exercising - please continue to invest in yourself with regular exercise. Continue to strive for a range for 150-300 minutes of exercise for weight maintenance and incorporating strength training twice a week to help preserve muscle!      Follow up:    Call [phone] to schedule next visit in next available. Be in touch on Mychart for refills/concerns between visits      Follow up:    Call [phone] to schedule next visit in next available. Be in touch on [email or web address] for refills/concerns between visits    26 minutes spent on the date of the encounter doing chart review, history and exam, result review, counseling, developing plan of care, documentation, and further activities as noted      INTERVAL HISTORY:  Dasha returns for medical weight management follow up.  Last seen on 7/22/2024 with Natasha Briceño PA-C.  Plan at that time to transition to Zepbound    Is losing GLP/GIP coverage for 2025.    Is doing well on Zepbound currently. Is at 12.5 mg of Zepbound - half way through first box and no side effects noted.    Cravings are controlled and being mindful as well.      WEIGHT METRICS:  Body mass index is 26.64 kg/m .   Current Weight: 141 lb (64 kg)  Last Visits Weight: 149 lb (67.6 kg)  Initial Weight (lbs): 144 lbs  Cumulative weight loss (lbs): 3  Weight Loss Percentage: 2.08%    Prior to Wegovy: BMI 32.88, 172 lbs 9.6oz  Now BMI 27.21, 144 lbs on Wegovy      Wt Readings from Last 10 Encounters:   12/05/24 141 lb (64 kg)   08/22/24 147 lb (66.7 kg)   07/22/24 149 lb (67.6 kg)   12/07/23 144 lb (65.3 kg)   03/23/23 172 lb 9.6 oz (78.3 kg)   01/02/23 171 lb 4.8 oz (77.7 kg)   05/13/21 152 lb 8 oz (69.2 kg)   02/11/20 141 lb  "14.4 oz (64.4 kg)   01/20/20 144 lb (65.3 kg)   11/14/19 139 lb 9 oz (63.3 kg)                 7/22/2024    10:13 AM   Weight Loss Medication History Reviewed With Patient   Which weight loss medications are you currently taking on a regular basis? Wegovy   Are you having any side effects from the weight loss medication that we have prescribed you? No           7/22/2024    10:13 AM   Changes and Difficulties   With regards to my diet, I am still struggling with: making healthy choices   I have made the following changes to my activity/exercise since my last visit: increased walking   With regards to my activity/exercise, I am still struggling with: consistensy       LABS:  Labs reviewed in Epic    8/22/2024 CMP normal    BP Readings from Last 6 Encounters:   12/05/24 117/80   08/22/24 119/83   07/22/24 111/77   12/07/23 95/64   03/23/23 101/70   01/02/23 120/78       Pulse Readings from Last 6 Encounters:   12/05/24 85   08/22/24 73   07/22/24 85   12/07/23 77   03/23/23 71   01/02/23 82       PE:  /80   Pulse 85   Ht 5' 1\" (1.549 m)   Wt 141 lb (64 kg)   SpO2 96%   BMI 26.64 kg/m    GENERAL: Healthy, alert and no distress  RESP: No audible wheeze, cough, or visible cyanosis.  No visible retractions or increased work of breathing.    SKIN: Visible skin clear. No significant rash, abnormal pigmentation or lesions.  PSYCH: Mentation appears normal, affect normal/bright, judgement and insight intact        "

## 2024-12-05 ENCOUNTER — OFFICE VISIT (OUTPATIENT)
Dept: SURGERY | Facility: CLINIC | Age: 54
End: 2024-12-05
Payer: COMMERCIAL

## 2024-12-05 VITALS
OXYGEN SATURATION: 96 % | WEIGHT: 141 LBS | HEIGHT: 61 IN | DIASTOLIC BLOOD PRESSURE: 80 MMHG | BODY MASS INDEX: 26.62 KG/M2 | HEART RATE: 85 BPM | SYSTOLIC BLOOD PRESSURE: 117 MMHG

## 2024-12-05 DIAGNOSIS — E66.3 OVERWEIGHT (BMI 25.0-29.9): ICD-10-CM

## 2024-12-05 DIAGNOSIS — Z86.39 HISTORY OF OBESITY: Primary | ICD-10-CM

## 2024-12-05 DIAGNOSIS — K21.9 GASTROESOPHAGEAL REFLUX DISEASE WITHOUT ESOPHAGITIS: ICD-10-CM

## 2024-12-05 RX ORDER — TIRZEPATIDE 15 MG/.5ML
15 INJECTION, SOLUTION SUBCUTANEOUS
Qty: 6 ML | Refills: 1 | Status: SHIPPED | OUTPATIENT
Start: 2024-12-05

## 2024-12-05 NOTE — PATIENT INSTRUCTIONS
Nice to talk with you today. Below is the plan discussed.-  Ev Mae PA-C     Pt Instructions:  Plan to increase to 15 mg Zepbound once weekly after finishing your 12.5 mg dose.  If you can  the 3 month fill - let me know on MyChart. I do have a refill of that dose if possible or a lower dose to try tapering.  Read about out of pocket options below and can be in touch as current supply finishes up for plan (I.e Zepbound through [facility] vs [employer] compounded semaglutide).   Can also read about Qsymia (can also recreate off label with topiramate/phentermine) as an alternative for injectables.     Goals:  Great job with exercising - please continue to invest in yourself with regular exercise. Continue to strive for a range for 150-300 minutes of exercise for weight maintenance and incorporating strength training twice a week to help preserve muscle!  Great job with exercising - please continue to invest in yourself with regular exercise. Continue to strive for a range for 150-300 minutes of exercise for weight maintenance and incorporating strength training twice a week to help preserve muscle!      Follow up:    Call [phone] to schedule next visit in next available. Be in touch on Mychart for refills/concerns between visits    Options for continuing GLP1/GIP agonist in 2025:  Pay out of pocket for Wegovy or Zepbound pens (>$1000/month cash price without savings card)   Zepbound cost with Zepbound savings card (link below to sign up for this): $650/month with card  [email or web address]://[email or web address].zepbound.[email or web address]/enroll/checkEnrollment  Wegovy cost with Wegovy savings card (link below to sign up for this): $650/month with card   https://www.[employer].Tendr/coverage-and-savings/save-on-wegovy.html  Compounded semaglutide (same active ingredient as Wegovy) from [facility] ($230/month for doses of 1mg or less; $370/month for doses higher than 1mg)  This is an available option for as long as  Wegovy is on FDA shortage list, Wegovy has been on the list for the last several months with no foreseeable end in sight as of now   Zepbound Vials through [employer] [facility] CASH PAY pharmacy - vials only available in 2.5 mg and 5 mg doses  $399/month for 2.5mg vials  $549/month for 5mg vials   $5 per month for administrations supplies (syringe/needles, etc)       QSYMIA (phentermine and topiramate)    We are considering starting Qsymia. This is a specific obesity medication and is a combination of Phentermine and Topiramate, formulated as a sustained release, taken one time a day. There are a few different doses of the medication. Doses come in 3.75/23 mg (usually skipped), 7.5/46 mg, 11.25/69 mg, and 15/92 mg.       For some of our patients, the pills work right away. They feel and think quite differently about food. Other patients don't feel much of a change but find in fact they have lost weight! Like all weight loss medications, Qsymia works best when you help it work.  This means:   1) Have less tempting high calorie (fattening) food around the house or office    2) Have lower calorie food (fruits, vegetables,low fat meats and dairy) for snacks    3) Eat out only one time or less each week.   4) Eat your meals at a table with the TV or computer off.    Side-effects (generally well tolerated because it is a sustained release medication)    Topiramate:   -Tingling in hands,feet, or face (usually not very troublesome)   -Mental confusion and word finding trouble (about 10% of patients have this.)     -Feeling sleepy or a bit dopey- this goes away very soon after starting.      Phentermine:    -Feelings of racing pulse or rapid heart beat.    -Increased anxiety.   -Some people can get an elevated blood pressure. Because of this we may have  you come back within a week or so of starting the medication for a blood pressure check.    One of the dangers of topiramate is the possibility of birth defects--if you get pregnant  when you are on it, there is the risk that your baby will be born with a cleft lip or palate.  If you are on topiramate and of child bearing age, you need to be on a reliable form of birth control or refrain from sexual intercourse.     It is very rare for insurance to pay for this and it typically costs around $200 per month. Please check out the website www.qsymia.com and sign up for the Pharmacy savings program to save $75 a month for 12 months if eligible. The medication can also be paid for out of pocket. It may require a prior authorization which could take up to 1-2 weeks.      For any questions or concerns please send a [facility] [employer] message to our team or call our weight management call center at [phone] during regular business hours. For questions during evenings or weekends your messages will be addressed during the next business day.  For emergencies please call 911 or seek immediate medical care.

## 2024-12-05 NOTE — ASSESSMENT & PLAN NOTE
Patient was congratulated on wt loss success thus far. Healthy habits to assist with further weight loss were discussed. Dasha will continue Zepbound titration and be in touch on MyChart pending supply and out of pocket options.

## 2024-12-09 ENCOUNTER — TELEPHONE (OUTPATIENT)
Dept: SURGERY | Facility: CLINIC | Age: 54
End: 2024-12-09
Payer: COMMERCIAL

## 2024-12-09 NOTE — TELEPHONE ENCOUNTER
[facility] called to check as to why both Zepbound 12.5 and 10 mg doses were sent.  Informed them provider was attempting for pt to get fills to carry them into the next year to taper med.   Annabel Krishnamurthy, MS, RD, RN

## 2025-05-14 NOTE — PROGRESS NOTES
2025      Return Medical Weight Management Note     Dasha Valenzuela  MRN:  [id number]  :  1970    Assessment & Plan   Problem List Items Addressed This Visit       History of obesity    Body mass index is 25.26 kg/m .   Current Weight: 133 lb 11.2 oz (60.6 kg)  Initial Weight (lbs): 144 lbs  Cumulative weight loss (lbs): 10.3  Weight Loss Percentage: 7.15%         GERD (gastroesophageal reflux disease)    Overweight (BMI 25.0-29.9) - Primary    Continue Zepbound taper down. Pt will send [employer] message when out of remaining pens and let us know if she would like to transition to cash-pay options or will read about Metformin as an oral option. We reviewed recommendations for muscle conservation including eating three meals daily, good protein intake, and strength training.              AOM Considerations per Natasha Briceño PA-C :  Phentermine:   Candidate, BP/P WNL on Losartan. Has used prior - Denies Active CAD ,Cardiac Arrythmias, Hyperthyroidism, and closed angle glaucoma.  Topiramate:     Current birth control:   IUD - copper,  Mild risk of CNS depression with trazodone- would monitor - discussed as an option. No hx of glucoma/kidney stones    GLP-1: prescribed Wegovy, no longer effective - will try Zepbound today  Naltrexone:      candidate   Wellbutrin:       has been on this in the past and has experienced ear ringing   Metformin:       candidate   Contrave:        Candidate, covered  Qsymia:          Candidate  , covered.  If Zepbound not covered would add to Wegovy      PATIENT INSTRUCTIONS:  Continue Zepbound every 7-14 days as part of weight maintenance. Send a message on [employer] when you are out of refills and to let us know what medication you would like to transition to.  Keep a close eye on abdominal pain symptoms and let us know if that returns.  Read about cash pay options, oral semaglutide, or oral Metformin as an option after you are out of Zepbound pens.    Goals:  Eat within 1  hour of waking. Great job prioritizing protein with your meals!  Strive to drink 64oz water throughout the day.  Strength train twice a week to preserve muscle.     Follow up:    Call [phone] to schedule next visit in August/September with Ev Mae PA-C or myself.    24 minutes spent on the date of the encounter doing chart review, history and exam, result review, counseling, developing plan of care, documentation, and further activities as noted      INTERVAL HISTORY:  Dasha returns for medical weight management follow up.  Last seen on 12/5/24 with Ev Mae PA-C and plan was to increase to 15mg Zepbound and read about Qsymia or cash-pay options. Lost coverage of Zepbound in 2025 so was given several refills of 10, 12.5, and 15mg doses.    Just finished the 15mg refills. Has been on 12.5mg dose for the past couple of injections and is maintaining weight. Is planning on finishing remaining 12.5mg doses and then decreasing to 10mg dose. Will have rare intermittent constipation that alternates with 1 episode of loose stools, but otherwise denies side effects on Zepbound. Has had 2 episodes of low stomach pain that was tender to touch for several days then resolved and has not happened since. Denies nausea or vomiting with it. Does not always inject every 7 days and will sometimes go up to 14 days between injections.    WEIGHT METRICS:  Body mass index is 25.26 kg/m .   Current Weight: 133 lb 11.2 oz (60.6 kg)  Last Visits Weight: 141 lb (64 kg)  Initial Weight (lbs): 144 lbs  Cumulative weight loss (lbs): 10.3  Weight Loss Percentage: 7.15%    Wt Readings from Last 10 Encounters:   05/22/25 133 lb 11.2 oz (60.6 kg)   12/05/24 141 lb (64 kg)   08/22/24 147 lb (66.7 kg)   07/22/24 149 lb (67.6 kg)   12/07/23 144 lb (65.3 kg)   03/23/23 172 lb 9.6 oz (78.3 kg)   01/02/23 171 lb 4.8 oz (77.7 kg)   05/13/21 152 lb 8 oz (69.2 kg)   02/11/20 141 lb 14.4 oz (64.4 kg)   01/20/20 144 lb (65.3 kg)                  7/22/2024    10:13 AM   Weight Loss Medication History Reviewed With Patient   Which weight loss medications are you currently taking on a regular basis? Wegovy   Are you having any side effects from the weight loss medication that we have prescribed you? No           7/22/2024    10:13 AM   Changes and Difficulties   With regards to my diet, I am still struggling with: making healthy choices   I have made the following changes to my activity/exercise since my last visit: increased walking   With regards to my activity/exercise, I am still struggling with: consistensy   B: skips or will eat something with protein. Coffee w/ collagen peptides  L: Lean Cuisine  D: Protein + vegetable  Snacks: none. Rare ice cream bar.    Hydration: Always has water nearby    Activity: walking or online videos with resistance training.      LABS:  Hemoglobin A1C   Date Value Ref Range Status   12/28/2023 4.9 0.0 - 5.6 % Final     Comment:     Normal <5.7%   Prediabetes 5.7-6.4%    Diabetes 6.5% or higher     Note: Adopted from ADA consensus guidelines.     Creatinine   Date Value Ref Range Status   08/22/2024 0.92 0.51 - 0.95 mg/dL Final   03/04/2008 1.27 0.60 - 1.30 mg/dL Final     GFR Estimate   Date Value Ref Range Status   08/22/2024 75 >60 mL/min/1.73m2 Final     Comment:     eGFR calculated using 2021 CKD-EPI equation.   12/14/2016 53 (L) >60 mL/min/1.73m2 Final   03/04/2008 51 (L) >60 mL/min/1.7m2 Final     Carbon Dioxide   Date Value Ref Range Status   03/04/2008 28 20 - 32 mmol/L Final     Carbon Dioxide (CO2)   Date Value Ref Range Status   08/22/2024 24 22 - 29 mmol/L Final     Chloride   Date Value Ref Range Status   08/22/2024 103 98 - 107 mmol/L Final   03/04/2008 105 94 - 109 mmol/L Final     Urea Nitrogen   Date Value Ref Range Status   08/22/2024 14.2 6.0 - 20.0 mg/dL Final   03/04/2008 14 5 - 24 mg/dL Final     ALT   Date Value Ref Range Status   08/22/2024 15 0 - 50 U/L Final     AST   Date Value Ref Range Status  "  08/22/2024 22 0 - 45 U/L Final     Vitamin D, Total (25-Hydroxy)   Date Value Ref Range Status   12/28/2023 82 (H) 20 - 50 ng/mL Final     Comment:     toxicity possible     TSH   Date Value Ref Range Status   12/28/2023 2.03 0.30 - 4.20 uIU/mL Final        BP Readings from Last 6 Encounters:   05/22/25 109/75   12/05/24 117/80   08/22/24 119/83   07/22/24 111/77   12/07/23 95/64   03/23/23 101/70       Pulse Readings from Last 6 Encounters:   05/22/25 82   12/05/24 85   08/22/24 73   07/22/24 85   12/07/23 77   03/23/23 71       PE:  /75   Pulse 82   Ht 5' 1\" (1.549 m)   Wt 133 lb 11.2 oz (60.6 kg)   SpO2 96%   BMI 25.26 kg/m    GENERAL: Healthy, alert and no distress  RESP: No audible wheeze, cough, or visible cyanosis.  No visible retractions or increased work of breathing.    SKIN: Visible skin clear. No significant rash, abnormal pigmentation or lesions.  PSYCH: Mentation appears normal, affect normal/bright, judgement and insight intact    Jeanne Brantley NP       "

## 2025-05-22 ENCOUNTER — OFFICE VISIT (OUTPATIENT)
Dept: SURGERY | Facility: CLINIC | Age: 55
End: 2025-05-22
Payer: COMMERCIAL

## 2025-05-22 VITALS
DIASTOLIC BLOOD PRESSURE: 75 MMHG | HEART RATE: 82 BPM | SYSTOLIC BLOOD PRESSURE: 109 MMHG | BODY MASS INDEX: 25.24 KG/M2 | WEIGHT: 133.7 LBS | HEIGHT: 61 IN | OXYGEN SATURATION: 96 %

## 2025-05-22 DIAGNOSIS — Z86.39 HISTORY OF OBESITY: ICD-10-CM

## 2025-05-22 DIAGNOSIS — K21.9 GASTROESOPHAGEAL REFLUX DISEASE WITHOUT ESOPHAGITIS: ICD-10-CM

## 2025-05-22 DIAGNOSIS — E66.3 OVERWEIGHT (BMI 25.0-29.9): Primary | ICD-10-CM

## 2025-05-22 NOTE — ASSESSMENT & PLAN NOTE
Body mass index is 25.26 kg/m .   Current Weight: 133 lb 11.2 oz (60.6 kg)  Initial Weight (lbs): 144 lbs  Cumulative weight loss (lbs): 10.3  Weight Loss Percentage: 7.15%

## 2025-05-22 NOTE — PATIENT INSTRUCTIONS
It was nice to talk with you today! Below is the plan discussed.-  BRADLY Angel      Pt Instructions:  Continue Zepbound every 7-14 days as part of weight maintenance. Send a message on MyChart when you are out of refills and to let us know what medication you would like to transition to.  Keep a close eye on abdominal pain symptoms and let us know if that returns.  Read about cash pay options, oral semaglutide, or oral Metformin as an option after you are out of Zepbound pens.    Goals:  Eat within 1 hour of waking. Great job prioritizing protein with your meals!  Strive to drink 64oz water throughout the day.  Strength train twice a week to preserve muscle.       Follow up:    Call [phone] to schedule next visit in August/September with Ev Mae PA-C or myself.      Out of Pocket GLP1a options:  Wegovy or Zepbound pens out of pocket cost (>$1000/month cash price without savings card)   Zepbound  Cost of any dose with savings card (link below to sign up): $650/month with card at any pharmacy   https://www.enrollment.Anulex.Supersolid/enroll/checkEnrollment - Medicaid, Medicare or other government insurances cannot use savings cards  Wegovy   Cost of any dose with savings card (link below to sign up): $650/month with card at any pharmacy   https://www.bMenu/coverage-and-savings/save-on-wegovy.html - Medicaid, Medicare or other government insurances cannot use savings cards  Cost for any dose through [facility]: $499/month - This is [facility] mail order pharmacy   Terms and Conditions of Use  [facility]   Additional $5 per 10 pack of for administrations supplies (syringe/needles, etc)   Zepbound Vials through [facility] - vials only available in 2.5 mg, 5 mg, 7.5mg, 10 mg doses  [email or web address]  $349/month for 2.5mg vials  $499/month for 5mg vials   7.5 mg and 10 mg vials now available with as well for $499/month with  regular refills (cost increases if refills not consistently filled at least every 45 days - see more info at [facility] website)  Additional $5 per month for administrations supplies (syringe/needles, etc)     Sublingual Semaglutide at [facility] is now offering sublingual semaglutide. This is an alternative option for patients who may not have access to or insurance coverage for commercially available semaglutide products.  Currently, the FDA-approved forms of semaglutide include:  Rybelsus (oral tablet) - approved for Type 2 Diabetes  Ozempic (injectable) - approved for Type 2 Diabetes  Wegovy (injectable) - approved for chronic weight management and cardiovascular risk reduction in certain populations  Unlike FDA-approved products, sublingual semaglutide has not been studied in clinical trials, so its effectiveness and safety in this form are not fully known. However, it may be a helpful alternative when other formulations are not accessible.    Sublingual means the medication is placed under the tongue, where it dissolves and is absorbed directly into the bloodstream. At [facility], sublingual semaglutide is made by combining commercially available Rybelsus tablets with a base called Submagna to create a liquid that can be absorbed under the tongue.    Compounding is permitted when a medication is not available in the needed form, like sublingual semaglutide, and a provider writes a prescription for a specific patient. [facility] follows strict safety and quality standards, including [place] Pharmacopeia (USP) 795 guidelines for non-sterile compounding. Since sublingual semaglutide is not available commercially, it is eligible for compounding under these guidelines.     Availability:  Sublingual semaglutide is expected to be available long term.    Dosing:  Available doses include 0.5 mg, 1 mg, 1.5 mg, 2 mg and 2.5  mg. Further dose escalation above 2.5 mg can be can discussed with your provider based on your individual response and if appropriate.     Sublingual Semaglutide 2 mg/mL   mg mL   0.5 mg 0.25 mL   1 mg 0.5 mL   1.5 mg 0.75 mL   2 mg 1 mL   2.5 mg 1.25 mL     New Start: Begin with 0.5 mg (0.25 mL) once daily for 2-4 weeks. If tolerated, increase to 1 mg (0.5 mL) once daily for at least 4 weeks. If necessary thereafter, dose can be increased by 0.5 mg (0.25 mL) every 4 weeks (e.g. 1.5 mg, then 2 mg, then 2.5 mg). Follow the dosing and escalation recommendations prescribed by your provider. Do not escalate dosing further than prescribed by your provider.     From injectable Semaglutide: There are no official studies comparing injectable and sublingual semaglutide doses. If you are switching from injectable to sublingual semaglutide, your provider will help choose the most appropriate dose for you.    Administration:  Sublingual semaglutide is administered once daily. Flavor is spearmint similar to [place] double mint gum or spearmint toothpaste.     Shake well before each use (product has a thick, honey-like consistency)  Use syringe to draw your dose from bottle   Place medication under your tongue and hold under tongue a long as possible (at least 60-90 seconds), then swallow  If the volume feels like too much, you may split the dose into two parts, taken 5-10 minutes apart  Avoid eating, drinking, or smoking for 30 minutes afterward    Helpful Routine Tip: Brush your teeth and rinse your mouth, take your medication, then shower or get ready for the day.    Storage:  Store at room temperature. The medication remains stable for 90 days.    Common Side Effects:  Side effects may be similar to FDA-approved semaglutide products (Rybelsus, Wegovy, Ozempic), though the sublingual version has not been formally studied. Common side effects include: nausea, diarrhea, constipation, headache, indigestion, tiredness (fatigue),  stomach upset or abdominal pain. Less commonly, semaglutide can cause low blood sugar (symptoms: shaky, dizzy, sweaty, agitation). Contact your care team if side effects are bothersome or unmanageable or if you are concerned for low blood sugar.     Tips to reduce side effects:   Eat regular meals (don t skip meals)  Stop eating when feeling satiated/satisfied.  Stay hydrated--aim for at least 64 oz of water daily    Obtaining Medication From Pharmacy:   You will receive an automated call once the pharmacy receives your prescription. You must call back and speak to a team member to confirm name, product, shipping, and cost. If you have not received a call within 3 business days, call the pharmacy directly.    [facility] Phone:  [phone]    Shipping available to: [place], [place], [place], [place], [place], [place], [place].     Managing Refills:  Call [phone]   Download the [employer] bailey Download  Online:  [email or web address] [email or web address] [email or web address]/#/[email or web address]    Cost:   0.5 mg and 1 mg doses  ~$180 for 30-day supply  ~$450 for 90-day supply   1.5 mg dose  ~$200-$300 for a 30-day supply   May have cost savings for 90 day supply  2 mg dose  ~$300 for a 30-day supply  May have cost savings for 90 day supply  Dosing higher than 2 mg will be determine by [facility]    METFORMIN    Metformin is a medication that is used to assist with lowering blood sugars in patients that have Pre-Diabetes or Diabetes. It has also been found to help with modest weight loss.    Metformin helps to lower blood sugars by lowering the amount of sugar (glucose) your liver produces that would otherwise cause your blood sugar to increase. It also makes your body respond better to insulin (the product that lowers your blood sugar).     Emerging research reported in journals suggests metformin may also lead to weight loss as a result of changes in the appetite centers of the brain, shifts in the gut microbiome, and reversal of metabolic changes that  usually happen with age.    Starting instructions:  Take 1 tablet by mouth daily with a meal for 1 week, then increase to 2 tablets daily with a meal, if tolerating can increase to 3 tablets daily with a meal or at bedtime.     Side-effects. Metformin is generally well tolerated. The main side-effects we see are: Diarrhea, nausea and stomach upset - this tends to subside over time as your body gets used to the medication. Taking this medications with food will lower these side effects.    For any questions or concerns please send a Majitek message to our team or call our weight management call center at [phone].     In order to get refills of this or any medication we prescribe you must be seen in the medical weight mgmt clinic every 6 months.     TOPIRAMATE (TOPAMAX)    Topiramate (Topamax) is a medication that is used most often to treat migraine headaches or for seizures. It has also been found to help with weight loss. Although it's not currently FDA approved for weight loss, it has been used safely for a number of years to help people who are carrying extra weight.   Topiramate helps with weight loss by working on areas of the brain to quiet down signals related to eating.     Topiramate may make you:    >feel less interest in eating in between meals   >think less about food and eating   >find it easier to push the plate away   >find giving up pop easier    >have an easier time eating less    For some of our patients, the pills work right away. They feel and think quite differently about food. Other patients don't feel much of a change but find in fact they have lost weight! Like all weight loss medications, topiramate works best when you help it work.  This means:   1) Have less tempting high calorie (fattening) food around the house or office    2) Have lower calorie food (fruits, vegetables,low fat meats and dairy) for snacks    3) Eat out only one time or less each week.   4) Eat your meals at a table  "with the TV or computer off.    Side-effects Topiramate is generally well tolerated. The main side-effects we see are:   Tingling in hands,feet, or face (usually not very troublesome)   Mental confusion and word finding trouble (about 10% of patients have this.)     Feeling sleepy or a bit dopey- this goes away very soon after starting.    One of the dangers of topiramate is the possibility of birth defects--if you get pregnant when you are on it, there is the risk that your baby will be born with a cleft lip or palate.  If you are on topiramate and of child bearing age, you need to be on a reliable form of birth control or refrain from sexual intercourse.     For any questions or concerns please send a Within3 message to our team or call our weight management call center at [phone] during regular business hours. For questions during evenings or weekends your messages will be addressed during the next business day.  For emergencies please call 911 or seek immediate medical care.     Naltrexone    Naltrexone is a medication that is used most often to help people who are troubled by dependence on prescription pain killers or alcohol. It has also been found to help with weight loss. Although it's not currently FDA approved for weight loss, it has been used safely for a number of years to help people who are carrying extra weight.     Just how Naltrexone helps with weight loss has not been exactly determined.  It seems to work by quieting down brain signals related to strong food cravings. Many of our patients use the word \"addiction\" to describe their feelings and constant thoughts about food. It makes sense then to treat the feeling of dependence on food, outside of real hunger, with a medication designed to help with other sorts of dependence.     Our patients on Naltrexone find that they:    >feel less interest in food   >think less about food and eating and have more time to think of other things   >find it " easier to push the plate away   >have an easier time eating less    For some of our patients, these feelings are very immediate. Other patients, don't feel much of a change but find they've lost weight. Like all weight loss medications, Naltrexone works best when you help it work. This means:  1. Having less tempting high calorie (fattening) food around the house or office. (For people with strong cravings this is very important.)   2. Staying away from situations or people that may trigger your cravings .   3. Eating out only one time or less each week.  4. Eating your meals at a table with the TV or computer off.    Side-effects. Naltrexone is generally well tolerated. The main side-effect we see is  nausea or a woozy feeling. A small number of people feel quite ill. Most people have a mild reaction and some people have no reaction at all.  The good news is that this feeling does go away.     In order to avoid nausea, please start the medication with half a pill for the first few days. Go on to a full pill if you are feeling well.      If you  are nauseated on 1/2 a pill it is okay to cut back to 1/4 pill ( a very small amount). Take this for a couple of days and work your way back up to a 1/2 pill and then a whole pill. Taking the medication at night or with food  to start also may help prevent the feeling of nausea.       WARNING: This medication blocks the action of opioid type pain medications.    If you routinely take narcotic pain medication like Codeine, Oxycontin,Percocet,Morphine,Dilaudid or Methodone, do not take this until you have talked with weight management staff.   2.  If you are planning surgery you should stop Naltrexone 4 days prior to the surgery.   3.  If you have an injury that requires pain medication, make sure the health care staff knows you take Naltrexone.       For any questions/concerns contact [facility] [phone]

## 2025-05-22 NOTE — ASSESSMENT & PLAN NOTE
Continue Zepbound taper down. Pt will send Rockbot message when out of remaining pens and let us know if she would like to transition to cash-pay options or will read about Metformin as an oral option. We reviewed recommendations for muscle conservation including eating three meals daily, good protein intake, and strength training.

## 2025-06-06 ENCOUNTER — LAB REQUISITION (OUTPATIENT)
Dept: LAB | Facility: CLINIC | Age: 55
End: 2025-06-06

## 2025-06-06 DIAGNOSIS — Z12.4 ENCOUNTER FOR SCREENING FOR MALIGNANT NEOPLASM OF CERVIX: ICD-10-CM

## 2025-06-06 PROCEDURE — G0145 SCR C/V CYTO,THINLAYER,RESCR: HCPCS | Performed by: OBSTETRICS & GYNECOLOGY

## 2025-06-06 PROCEDURE — 87624 HPV HI-RISK TYP POOLED RSLT: CPT | Performed by: OBSTETRICS & GYNECOLOGY

## 2025-06-09 LAB
HPV HR 12 DNA CVX QL NAA+PROBE: NEGATIVE
HPV16 DNA CVX QL NAA+PROBE: NEGATIVE
HPV18 DNA CVX QL NAA+PROBE: NEGATIVE
HUMAN PAPILLOMA VIRUS FINAL DIAGNOSIS: NORMAL

## 2025-06-11 LAB
BKR AP ASSOCIATED HPV REPORT: NORMAL
BKR LAB AP GYN ADEQUACY: NORMAL
BKR LAB AP GYN INTERPRETATION: NORMAL
BKR LAB AP LMP: NORMAL
BKR LAB AP PREVIOUS ABNL DX: NORMAL
BKR LAB AP PREVIOUS ABNORMAL: NORMAL
PATH REPORT.COMMENTS IMP SPEC: NORMAL
PATH REPORT.COMMENTS IMP SPEC: NORMAL
PATH REPORT.RELEVANT HX SPEC: NORMAL

## 2025-06-16 ENCOUNTER — ANCILLARY PROCEDURE (OUTPATIENT)
Dept: GENERAL RADIOLOGY | Facility: CLINIC | Age: 55
End: 2025-06-16
Payer: COMMERCIAL

## 2025-06-16 ENCOUNTER — OFFICE VISIT (OUTPATIENT)
Dept: FAMILY MEDICINE | Facility: CLINIC | Age: 55
End: 2025-06-16
Payer: COMMERCIAL

## 2025-06-16 VITALS
RESPIRATION RATE: 16 BRPM | WEIGHT: 136 LBS | HEART RATE: 97 BPM | SYSTOLIC BLOOD PRESSURE: 91 MMHG | DIASTOLIC BLOOD PRESSURE: 63 MMHG | TEMPERATURE: 98.2 F | BODY MASS INDEX: 25.68 KG/M2 | HEIGHT: 61 IN | OXYGEN SATURATION: 96 %

## 2025-06-16 DIAGNOSIS — R05.2 SUBACUTE COUGH: Primary | ICD-10-CM

## 2025-06-16 DIAGNOSIS — R05.2 SUBACUTE COUGH: ICD-10-CM

## 2025-06-16 DIAGNOSIS — M31.31 GRANULOMATOSIS WITH POLYANGIITIS WITH RENAL INVOLVEMENT (H): ICD-10-CM

## 2025-06-16 PROCEDURE — 71046 X-RAY EXAM CHEST 2 VIEWS: CPT | Mod: TC | Performed by: RADIOLOGY

## 2025-06-16 PROCEDURE — 3078F DIAST BP <80 MM HG: CPT

## 2025-06-16 PROCEDURE — 99214 OFFICE O/P EST MOD 30 MIN: CPT

## 2025-06-16 PROCEDURE — 3074F SYST BP LT 130 MM HG: CPT

## 2025-06-16 RX ORDER — PREDNISONE 20 MG/1
60 TABLET ORAL DAILY
Qty: 15 TABLET | Refills: 0 | Status: SHIPPED | OUTPATIENT
Start: 2025-06-16 | End: 2025-06-21

## 2025-06-16 RX ORDER — FLUTICASONE PROPIONATE 220 UG/1
1 AEROSOL, METERED RESPIRATORY (INHALATION) 2 TIMES DAILY
Qty: 36 G | Refills: 4 | Status: SHIPPED | OUTPATIENT
Start: 2025-06-16

## 2025-06-16 ASSESSMENT — PATIENT HEALTH QUESTIONNAIRE - PHQ9
SUM OF ALL RESPONSES TO PHQ QUESTIONS 1-9: 4
10. IF YOU CHECKED OFF ANY PROBLEMS, HOW DIFFICULT HAVE THESE PROBLEMS MADE IT FOR YOU TO DO YOUR WORK, TAKE CARE OF THINGS AT HOME, OR GET ALONG WITH OTHER PEOPLE: NOT DIFFICULT AT ALL
SUM OF ALL RESPONSES TO PHQ QUESTIONS 1-9: 4

## 2025-06-16 ASSESSMENT — ENCOUNTER SYMPTOMS: COUGH: 1

## 2025-06-16 NOTE — PROGRESS NOTES
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 No signs of pneumonia or other nefarious findings on chest X-ray by my read but will defer to radiology.  - Prescribe a burst of prednisone, 60 mg for five days. Patient advised to follow up with [facility] for further evaluation and labs.  - Refill Flovent inhaler. Monitor response to prednisone burst. No additional antibiotics needed  - fluticasone (FLOVENT HFA) 220 MCG/ACT inhaler  Dispense: 36 g; Refill: 4  - XR Chest 2 Views  - predniSONE (DELTASONE) 20 MG tablet  Dispense: 15 tablet; Refill: 0  - XR Chest 2 Views  - predniSONE (DELTASONE) 20 MG tablet  Dispense: 15 tablet; Refill: 0      BMI  Estimated body mass index is 25.7 kg/m  as calculated from the following:    Height as of this encounter: 1.549 m (5' 1\").    Weight as of this encounter: 61.7 kg (136 lb).             Subjective   Dasha is a 54 year old, presenting for the following health issues:  Cough (- for the past three weeks; ramped up over the weekend; wet cough; crackling; SOB; unable to sleep; chest tightness)        6/16/2025     1:09 PM   Additional Questions   Roomed by DIONTE Monsivais   Accompanied by Self     Cough    History of Present Illness       Reason for visit:  Cough and sob with activity  Symptom onset:  3-7 days ago  Symptoms include:  Cough  Symptom intensity:  Moderate  Symptom progression:  Worsening  Had these symptoms before:  No She is missing 3 dose(s) of medications per week.   Dasha Valenzuela, 54 years old, female    - Coughing for 3 weeks, worsening significantly, causing sleep disturbances and shortness of breath with activity.  - Fever approximately 3 weeks ago, with a maximum temperature of 101.9 F, lasting 3-4 days, accompanied by aches and mild headache.  - Baseline cough due to Reflux Guard Polyangiitis, " "previously known as Wegener's Granulomatosis.  - History of Rituximab infusions, last infusion almost 2 years ago.  - Regular sinus issues with crusting, requiring sinus rinses.  - Shortness of breath when climbing stairs, unusual for her.  - Feeling of head being in a glass jar, spacey sensation since Thursday, Friday, Saturday, and yesterday.  - Negative COVID test 3 weeks ago.  - Baseline use of Bactrim and Flovent inhaler, currently out of Flovent.  - Previous treatments include methotrexate and azathioprine, with bone marrow suppression on azathioprine.      Acute Illness  Acute illness concerns: cough   Onset/Duration: three weeks ago  Symptoms:  Fever: YES  Chills/Sweats: YES  Headache (location?): YES  Sinus Pressure: No  Conjunctivitis:  No  Ear Pain: no  Rhinorrhea: No  Congestion: YES  Sore Throat: YES- scratchy  Cough: YES  Wheeze: No  Decreased Appetite: No  Nausea: No  Vomiting: No  Diarrhea: No  Dysuria/Freq.: No  Dysuria or Hematuria: No  Fatigue/Achiness: No  Sick/Strep Exposure: No  Therapies tried and outcome: Cough Suppressant             Objective    BP 91/63 (BP Location: Right arm, Patient Position: Sitting, Cuff Size: Adult Large)   Pulse 97   Temp 98.2  F (36.8  C) (Oral)   Resp 16   Ht 1.549 m (5' 1\")   Wt 61.7 kg (136 lb)   LMP  (LMP Unknown)   SpO2 96%   Breastfeeding No   BMI 25.70 kg/m    Body mass index is 25.7 kg/m .  Physical Exam  Constitutional:       General: She is not in acute distress.     Appearance: Normal appearance. She is normal weight. She is not ill-appearing, toxic-appearing or diaphoretic.   HENT:      Head: Normocephalic and atraumatic.      Mouth/Throat:      Mouth: Mucous membranes are moist.   Cardiovascular:      Rate and Rhythm: Normal rate and regular rhythm.      Pulses: Normal pulses.      Heart sounds: Normal heart sounds.   Pulmonary:      Effort: Pulmonary effort is normal. No respiratory distress.      Breath sounds: No stridor. Wheezing present. " No rhonchi or rales.   Musculoskeletal:         General: Normal range of motion.   Skin:     General: Skin is warm and dry.      Capillary Refill: Capillary refill takes less than 2 seconds.   Neurological:      Mental Status: She is alert.   Psychiatric:         Mood and Affect: Mood normal.                    Signed Electronically by: JF Zamora CNP    Answers submitted by the patient for this visit:  Patient Health Questionnaire (Submitted on 6/16/2025)  If you checked off any problems, how difficult have these problems made it for you to do your work, take care of things at home, or get along with other people?: Not difficult at all  PHQ9 TOTAL SCORE: 4  General Questionnaire (Submitted on 6/16/2025)  Chief Complaint: Chronic problems general questions HPI Form  How many days per week do you miss taking your medication?: 3  General Concern (Submitted on 6/16/2025)  Chief Complaint: Chronic problems general questions HPI Form  What is the reason for your visit today?: cough and sob with activity  When did your symptoms begin?: 3-7 days ago  What are your symptoms?: cough  How would you describe these symptoms?: Moderate  Are your symptoms:: Worsening  Have you had these symptoms before?: No  Questionnaire about: Chronic problems general questions HPI Form (Submitted on 6/16/2025)  Chief Complaint: Chronic problems general questions HPI Form

## 2025-08-28 ENCOUNTER — OFFICE VISIT (OUTPATIENT)
Dept: FAMILY MEDICINE | Facility: CLINIC | Age: 55
End: 2025-08-28
Payer: COMMERCIAL

## 2025-08-28 VITALS
DIASTOLIC BLOOD PRESSURE: 68 MMHG | BODY MASS INDEX: 26.88 KG/M2 | TEMPERATURE: 97.5 F | HEIGHT: 61 IN | OXYGEN SATURATION: 95 % | HEART RATE: 73 BPM | SYSTOLIC BLOOD PRESSURE: 99 MMHG | RESPIRATION RATE: 16 BRPM | WEIGHT: 142.4 LBS

## 2025-08-28 DIAGNOSIS — N18.31 STAGE 3A CHRONIC KIDNEY DISEASE (H): ICD-10-CM

## 2025-08-28 DIAGNOSIS — F51.04 PSYCHOPHYSIOLOGICAL INSOMNIA: ICD-10-CM

## 2025-08-28 DIAGNOSIS — Z13.6 SCREENING FOR CARDIOVASCULAR CONDITION: ICD-10-CM

## 2025-08-28 DIAGNOSIS — Z86.39 HISTORY OF OBESITY: ICD-10-CM

## 2025-08-28 DIAGNOSIS — M31.31 GRANULOMATOSIS WITH POLYANGIITIS WITH RENAL INVOLVEMENT (H): ICD-10-CM

## 2025-08-28 DIAGNOSIS — E78.5 HYPERLIPIDEMIA LDL GOAL <70: ICD-10-CM

## 2025-08-28 DIAGNOSIS — K21.00 GASTROESOPHAGEAL REFLUX DISEASE WITH ESOPHAGITIS, UNSPECIFIED WHETHER HEMORRHAGE: ICD-10-CM

## 2025-08-28 DIAGNOSIS — Z00.00 ROUTINE GENERAL MEDICAL EXAMINATION AT A HEALTH CARE FACILITY: Primary | ICD-10-CM

## 2025-08-28 DIAGNOSIS — F41.9 ANXIETY: ICD-10-CM

## 2025-08-28 DIAGNOSIS — F33.2 SEVERE EPISODE OF RECURRENT MAJOR DEPRESSIVE DISORDER, WITHOUT PSYCHOTIC FEATURES (H): ICD-10-CM

## 2025-08-28 LAB
ALBUMIN SERPL BCG-MCNC: 4.2 G/DL (ref 3.5–5.2)
ALP SERPL-CCNC: 69 U/L (ref 40–150)
ALT SERPL W P-5'-P-CCNC: 23 U/L (ref 0–50)
ANION GAP SERPL CALCULATED.3IONS-SCNC: 12 MMOL/L (ref 7–15)
AST SERPL W P-5'-P-CCNC: 28 U/L (ref 0–45)
BILIRUB SERPL-MCNC: 0.4 MG/DL
BUN SERPL-MCNC: 27.7 MG/DL (ref 6–20)
CALCIUM SERPL-MCNC: 9.6 MG/DL (ref 8.8–10.4)
CHLORIDE SERPL-SCNC: 97 MMOL/L (ref 98–107)
CHOLEST SERPL-MCNC: 255 MG/DL
CREAT SERPL-MCNC: 1.28 MG/DL (ref 0.51–0.95)
CREAT UR-MCNC: 24.2 MG/DL
EGFRCR SERPLBLD CKD-EPI 2021: 50 ML/MIN/1.73M2
FASTING STATUS PATIENT QL REPORTED: YES
FASTING STATUS PATIENT QL REPORTED: YES
GLUCOSE SERPL-MCNC: 81 MG/DL (ref 70–99)
HCO3 SERPL-SCNC: 28 MMOL/L (ref 22–29)
HDLC SERPL-MCNC: 87 MG/DL
HGB BLD-MCNC: 14.4 G/DL (ref 11.7–15.7)
LDLC SERPL CALC-MCNC: 149 MG/DL
MCV RBC AUTO: 85.5 FL (ref 78–100)
MICROALBUMIN UR-MCNC: 21.9 MG/L
MICROALBUMIN/CREAT UR: 90.5 MG/G CR (ref 0–25)
NONHDLC SERPL-MCNC: 168 MG/DL
POTASSIUM SERPL-SCNC: 4.2 MMOL/L (ref 3.4–5.3)
PROT SERPL-MCNC: 7.4 G/DL (ref 6.4–8.3)
SODIUM SERPL-SCNC: 137 MMOL/L (ref 135–145)
TRIGL SERPL-MCNC: 97 MG/DL
VIT D+METAB SERPL-MCNC: 76 NG/ML (ref 20–50)

## 2025-08-28 RX ORDER — LOSARTAN POTASSIUM 25 MG/1
25 TABLET ORAL DAILY
Qty: 90 TABLET | Refills: 3 | Status: SHIPPED | OUTPATIENT
Start: 2025-08-28

## 2025-08-28 RX ORDER — SERTRALINE HYDROCHLORIDE 100 MG/1
100 TABLET, FILM COATED ORAL DAILY
Qty: 90 TABLET | Refills: 3 | Status: SHIPPED | OUTPATIENT
Start: 2025-09-28

## 2025-08-28 RX ORDER — TRAZODONE HYDROCHLORIDE 50 MG/1
50-100 TABLET ORAL AT BEDTIME
Qty: 180 TABLET | Refills: 3 | Status: SHIPPED | OUTPATIENT
Start: 2025-11-20

## 2025-08-28 RX ORDER — SULFAMETHOXAZOLE AND TRIMETHOPRIM 800; 160 MG/1; MG/1
1 TABLET ORAL DAILY
Qty: 90 TABLET | Refills: 3 | Status: SHIPPED | OUTPATIENT
Start: 2025-08-28

## 2025-08-28 SDOH — HEALTH STABILITY: PHYSICAL HEALTH: ON AVERAGE, HOW MANY MINUTES DO YOU ENGAGE IN EXERCISE AT THIS LEVEL?: 20 MIN

## 2025-08-28 SDOH — HEALTH STABILITY: PHYSICAL HEALTH: ON AVERAGE, HOW MANY DAYS PER WEEK DO YOU ENGAGE IN MODERATE TO STRENUOUS EXERCISE (LIKE A BRISK WALK)?: 2 DAYS

## 2025-08-28 ASSESSMENT — PATIENT HEALTH QUESTIONNAIRE - PHQ9
SUM OF ALL RESPONSES TO PHQ QUESTIONS 1-9: 2
10. IF YOU CHECKED OFF ANY PROBLEMS, HOW DIFFICULT HAVE THESE PROBLEMS MADE IT FOR YOU TO DO YOUR WORK, TAKE CARE OF THINGS AT HOME, OR GET ALONG WITH OTHER PEOPLE: NOT DIFFICULT AT ALL
SUM OF ALL RESPONSES TO PHQ QUESTIONS 1-9: 2

## 2025-08-28 ASSESSMENT — ANXIETY QUESTIONNAIRES
7. FEELING AFRAID AS IF SOMETHING AWFUL MIGHT HAPPEN: NOT AT ALL
8. IF YOU CHECKED OFF ANY PROBLEMS, HOW DIFFICULT HAVE THESE MADE IT FOR YOU TO DO YOUR WORK, TAKE CARE OF THINGS AT HOME, OR GET ALONG WITH OTHER PEOPLE?: NOT DIFFICULT AT ALL
5. BEING SO RESTLESS THAT IT IS HARD TO SIT STILL: NOT AT ALL
GAD7 TOTAL SCORE: 0
4. TROUBLE RELAXING: NOT AT ALL
3. WORRYING TOO MUCH ABOUT DIFFERENT THINGS: NOT AT ALL
GAD7 TOTAL SCORE: 0
7. FEELING AFRAID AS IF SOMETHING AWFUL MIGHT HAPPEN: NOT AT ALL
GAD7 TOTAL SCORE: 0
IF YOU CHECKED OFF ANY PROBLEMS ON THIS QUESTIONNAIRE, HOW DIFFICULT HAVE THESE PROBLEMS MADE IT FOR YOU TO DO YOUR WORK, TAKE CARE OF THINGS AT HOME, OR GET ALONG WITH OTHER PEOPLE: NOT DIFFICULT AT ALL
6. BECOMING EASILY ANNOYED OR IRRITABLE: NOT AT ALL
2. NOT BEING ABLE TO STOP OR CONTROL WORRYING: NOT AT ALL
1. FEELING NERVOUS, ANXIOUS, OR ON EDGE: NOT AT ALL

## 2025-08-28 ASSESSMENT — PAIN SCALES - GENERAL: PAINLEVEL_OUTOF10: NO PAIN (0)

## 2025-09-01 DIAGNOSIS — N18.31 STAGE 3A CHRONIC KIDNEY DISEASE (H): Primary | ICD-10-CM

## 2025-09-01 DIAGNOSIS — E78.5 HYPERLIPIDEMIA LDL GOAL <70: ICD-10-CM

## 2025-09-01 PROCEDURE — 3050F LDL-C >= 130 MG/DL: CPT | Performed by: FAMILY MEDICINE

## 2025-09-01 RX ORDER — ROSUVASTATIN CALCIUM 10 MG/1
10 TABLET, COATED ORAL DAILY
Qty: 90 TABLET | Refills: 3 | Status: SHIPPED | OUTPATIENT
Start: 2025-09-01